# Patient Record
Sex: FEMALE | NOT HISPANIC OR LATINO | Employment: FULL TIME | URBAN - METROPOLITAN AREA
[De-identification: names, ages, dates, MRNs, and addresses within clinical notes are randomized per-mention and may not be internally consistent; named-entity substitution may affect disease eponyms.]

---

## 2023-05-02 DIAGNOSIS — M54.40 ACUTE RIGHT-SIDED LOW BACK PAIN WITH SCIATICA, SCIATICA LATERALITY UNSPECIFIED: Primary | ICD-10-CM

## 2023-05-02 RX ORDER — CYCLOBENZAPRINE HCL 10 MG
TABLET ORAL
Qty: 60 TABLET | Refills: 17 | Status: SHIPPED | OUTPATIENT
Start: 2023-05-02

## 2023-05-05 ENCOUNTER — OFFICE VISIT (OUTPATIENT)
Dept: PRIMARY CARE | Facility: CLINIC | Age: 53
End: 2023-05-05
Payer: OTHER GOVERNMENT

## 2023-05-05 ENCOUNTER — LAB (OUTPATIENT)
Dept: LAB | Facility: LAB | Age: 53
End: 2023-05-05
Payer: OTHER GOVERNMENT

## 2023-05-05 VITALS
BODY MASS INDEX: 36.37 KG/M2 | SYSTOLIC BLOOD PRESSURE: 128 MMHG | HEIGHT: 64 IN | DIASTOLIC BLOOD PRESSURE: 74 MMHG | WEIGHT: 213 LBS

## 2023-05-05 DIAGNOSIS — M77.12 LATERAL EPICONDYLITIS OF LEFT ELBOW: ICD-10-CM

## 2023-05-05 DIAGNOSIS — E78.5 HYPERLIPIDEMIA, UNSPECIFIED HYPERLIPIDEMIA TYPE: ICD-10-CM

## 2023-05-05 DIAGNOSIS — I10 PRIMARY HYPERTENSION: ICD-10-CM

## 2023-05-05 DIAGNOSIS — E13.9 OTHER SPECIFIED DIABETES MELLITUS WITHOUT COMPLICATION, WITHOUT LONG-TERM CURRENT USE OF INSULIN (MULTI): ICD-10-CM

## 2023-05-05 DIAGNOSIS — M25.561 PAIN IN BOTH KNEES, UNSPECIFIED CHRONICITY: ICD-10-CM

## 2023-05-05 DIAGNOSIS — M25.562 PAIN IN BOTH KNEES, UNSPECIFIED CHRONICITY: ICD-10-CM

## 2023-05-05 PROBLEM — N39.0 ACUTE URINARY TRACT INFECTION: Status: ACTIVE | Noted: 2023-05-05

## 2023-05-05 PROBLEM — R93.1 ABNORMAL HEART SCORE CT: Status: ACTIVE | Noted: 2023-05-05

## 2023-05-05 PROBLEM — E61.1 LOW IRON: Status: ACTIVE | Noted: 2023-05-05

## 2023-05-05 PROBLEM — K22.70 BARRETT ESOPHAGUS: Status: ACTIVE | Noted: 2023-05-05

## 2023-05-05 PROBLEM — M67.02 ACQUIRED SHORT ACHILLES TENDON OF LEFT LOWER EXTREMITY: Status: ACTIVE | Noted: 2023-05-05

## 2023-05-05 PROBLEM — E11.9 DIABETES (MULTI): Status: ACTIVE | Noted: 2023-05-05

## 2023-05-05 PROBLEM — D21.6: Status: ACTIVE | Noted: 2023-05-05

## 2023-05-05 PROBLEM — N20.9 UROLITHIASIS: Status: ACTIVE | Noted: 2023-05-05

## 2023-05-05 PROBLEM — R00.0 TACHYCARDIA: Status: ACTIVE | Noted: 2023-05-05

## 2023-05-05 PROBLEM — M79.671 INTRACTABLE RIGHT HEEL PAIN: Status: ACTIVE | Noted: 2023-05-05

## 2023-05-05 PROBLEM — M54.9 BACK PAIN: Status: ACTIVE | Noted: 2023-05-05

## 2023-05-05 PROBLEM — R60.9 EDEMA: Status: ACTIVE | Noted: 2023-05-05

## 2023-05-05 PROBLEM — D50.9 IRON DEFICIENCY ANEMIA: Status: ACTIVE | Noted: 2023-05-05

## 2023-05-05 PROBLEM — R06.09 DYSPNEA ON EXERTION: Status: ACTIVE | Noted: 2023-05-05

## 2023-05-05 PROBLEM — R91.1 PULMONARY NODULE: Status: ACTIVE | Noted: 2023-05-05

## 2023-05-05 PROBLEM — M21.6X2: Status: ACTIVE | Noted: 2023-05-05

## 2023-05-05 PROBLEM — H66.91 ACUTE RIGHT OTITIS MEDIA: Status: ACTIVE | Noted: 2023-05-05

## 2023-05-05 PROBLEM — J01.90 ACUTE SINUSITIS: Status: ACTIVE | Noted: 2023-05-05

## 2023-05-05 PROBLEM — R10.9 ABDOMINAL PAIN: Status: ACTIVE | Noted: 2023-05-05

## 2023-05-05 PROBLEM — R53.83 FATIGUE: Status: ACTIVE | Noted: 2023-05-05

## 2023-05-05 PROBLEM — R31.9 HEMATURIA: Status: ACTIVE | Noted: 2023-05-05

## 2023-05-05 PROBLEM — N20.1 RIGHT URETERAL STONE: Status: ACTIVE | Noted: 2023-05-05

## 2023-05-05 PROBLEM — E04.1 THYROID NODULE: Status: ACTIVE | Noted: 2023-05-05

## 2023-05-05 PROBLEM — E55.9 VITAMIN D DEFICIENCY: Status: ACTIVE | Noted: 2023-05-05

## 2023-05-05 PROBLEM — R25.1 TREMORS OF NERVOUS SYSTEM: Status: ACTIVE | Noted: 2023-05-05

## 2023-05-05 PROBLEM — N20.0 CALCULUS OF KIDNEY: Status: ACTIVE | Noted: 2023-05-05

## 2023-05-05 PROBLEM — M54.2 NECK PAIN: Status: ACTIVE | Noted: 2023-05-05

## 2023-05-05 PROBLEM — N92.0 SPOTTING: Status: ACTIVE | Noted: 2023-05-05

## 2023-05-05 PROBLEM — Z98.84 HISTORY OF ROUX-EN-Y GASTRIC BYPASS: Status: ACTIVE | Noted: 2023-05-05

## 2023-05-05 PROBLEM — H92.09 EAR PAIN: Status: ACTIVE | Noted: 2023-05-05

## 2023-05-05 PROCEDURE — 85027 COMPLETE CBC AUTOMATED: CPT

## 2023-05-05 PROCEDURE — 81001 URINALYSIS AUTO W/SCOPE: CPT

## 2023-05-05 PROCEDURE — 99214 OFFICE O/P EST MOD 30 MIN: CPT | Performed by: INTERNAL MEDICINE

## 2023-05-05 PROCEDURE — 83036 HEMOGLOBIN GLYCOSYLATED A1C: CPT

## 2023-05-05 PROCEDURE — 36415 COLL VENOUS BLD VENIPUNCTURE: CPT

## 2023-05-05 PROCEDURE — 4010F ACE/ARB THERAPY RXD/TAKEN: CPT | Performed by: INTERNAL MEDICINE

## 2023-05-05 PROCEDURE — 3078F DIAST BP <80 MM HG: CPT | Performed by: INTERNAL MEDICINE

## 2023-05-05 PROCEDURE — 80061 LIPID PANEL: CPT

## 2023-05-05 PROCEDURE — 3044F HG A1C LEVEL LT 7.0%: CPT | Performed by: INTERNAL MEDICINE

## 2023-05-05 PROCEDURE — 3074F SYST BP LT 130 MM HG: CPT | Performed by: INTERNAL MEDICINE

## 2023-05-05 PROCEDURE — 80053 COMPREHEN METABOLIC PANEL: CPT

## 2023-05-05 RX ORDER — FUROSEMIDE 40 MG/1
1 TABLET ORAL DAILY PRN
COMMUNITY
Start: 2022-01-13

## 2023-05-05 RX ORDER — IBUPROFEN 600 MG/1
1 TABLET ORAL 3 TIMES DAILY PRN
COMMUNITY
Start: 2020-02-07

## 2023-05-05 RX ORDER — HYDROXYZINE HYDROCHLORIDE 25 MG/1
1 TABLET, FILM COATED ORAL 3 TIMES DAILY PRN
COMMUNITY
Start: 2021-08-13 | End: 2023-07-14

## 2023-05-05 RX ORDER — SITAGLIPTIN AND METFORMIN HYDROCHLORIDE 1000; 50 MG/1; MG/1
TABLET, FILM COATED, EXTENDED RELEASE ORAL
COMMUNITY
Start: 2023-05-02 | End: 2023-07-14

## 2023-05-05 RX ORDER — AMLODIPINE BESYLATE 5 MG/1
5 TABLET ORAL DAILY
COMMUNITY
Start: 2018-03-27

## 2023-05-05 RX ORDER — LOSARTAN POTASSIUM 100 MG/1
1 TABLET ORAL DAILY
COMMUNITY
Start: 2018-03-27 | End: 2023-07-18

## 2023-05-05 RX ORDER — NAPROXEN SODIUM 220 MG/1
1 TABLET, FILM COATED ORAL DAILY
COMMUNITY

## 2023-05-05 RX ORDER — POTASSIUM CHLORIDE 1500 MG/1
1 TABLET, EXTENDED RELEASE ORAL DAILY
COMMUNITY
Start: 2020-09-04

## 2023-05-05 RX ORDER — METOPROLOL SUCCINATE 50 MG/1
1 TABLET, EXTENDED RELEASE ORAL DAILY
COMMUNITY
Start: 2022-01-13 | End: 2024-04-03

## 2023-05-05 NOTE — PROGRESS NOTES
OFFICE NOTE    NAME OF THE PATIENT: Antonio Harrison    YOB: 1970    CHIEF COMPLAINT:  This young lady today came here for multiple medical issues.  She has excruciating left elbow pain, dog pulled on her, very painful.  First thing in the morning she is stiff.  Both knees hurting and arthritis.  She is still due for a diabetic checkup.  She is due for blood work.  She came here for follow-up on various conditions.    PAST MEDICAL HISTORY:  Reviewed on EMR, unchanged.    CURRENT MEDICATIONS:  Reviewed on EMR, unchanged.  List reviewed.    ALLERGIES:  Reviewed on EMR, unchanged.    SOCIAL HISTORY:  Reviewed on EMR, unchanged.  She does not smoke and does not drink alcohol.    FAMILY HISTORY:  Reviewed on EMR, unchanged.    REVIEW OF SYSTEMS:  All 12 systems reviewed and pertaining covered in history and physical.    PHYSICAL EXAMINATION  VITAL SIGNS:  As recorded and reviewed from EMR.  RESPIRATORY:  The patient had normal inspirations and expirations.  The breath sounds were equal bilaterally and clear to auscultation.  CARDIOVASCULAR:  The patient had S1 normal, split S2 without obvious rubs, clicks, or murmurs.    GASTROINTESTINAL:  There was no hepatosplenomegaly.  There were no palpable masses and no inguinal nodes.  EXTREMITIES:  Legs had no edema.  Left hand and elbow, medial epicondyle very tender.  Movements painful.  Both knees swelling and arthritis.  NEUROLOGIC:  The patient had normal cranial nerves.  The reflexes, sensory, and motor examination were grossly within normal limits.    LAB WORK:  Laboratory testing discussed.    ASSESSMENT AND PLAN:  Elbow pain.  It looks like Golfer's elbow or medial tendinitis.  Ice, splints and Relafen.  Physical therapy.  Monitor.  Both knees pain, simple arthritis.  Start with x-ray, physical therapy.  Refer to Dr. Duran.  Type 2 diabetes.  She is taking medication.  I am going to check blood work.  Might introduce her to  newer drugs.  Blood pressure  "and cholesterol.  Monitor.  I shall see her back in a week after testing.      Kindly review this note in conjunction with EMR.   Subjective   Patient ID: Antonio Harrison is a 52 y.o. female who presents for Elbow Injury (Follow up).      HPI    Review of Systems    Objective   /74   Ht 1.626 m (5' 4\")   Wt 96.6 kg (213 lb)   BMI 36.56 kg/m²       Physical Exam    Assessment/Plan   Problem List Items Addressed This Visit          Circulatory    Hypertension    Relevant Orders    CBC    Urinalysis with Reflex Microscopic       Endocrine/Metabolic    Diabetes (CMS/[facility])    Relevant Orders    Hemoglobin A1c       Other    Hyperlipidemia    Relevant Orders    Comprehensive metabolic panel    Lipid panel     Other Visit Diagnoses       Lateral epicondylitis of left elbow        Relevant Orders    Referral to Physical Therapy    Pain in both knees, unspecified chronicity        Relevant Orders    Referral to Orthopaedic Surgery    XR knee 3 views bilateral (Completed)              "

## 2023-05-06 LAB
ALANINE AMINOTRANSFERASE (SGPT) (U/L) IN SER/PLAS: 22 U/L (ref 7–45)
ALBUMIN (G/DL) IN SER/PLAS: 4.7 G/DL (ref 3.4–5)
ALKALINE PHOSPHATASE (U/L) IN SER/PLAS: 98 U/L (ref 33–110)
ANION GAP IN SER/PLAS: 12 MMOL/L (ref 10–20)
APPEARANCE, URINE: ABNORMAL
ASPARTATE AMINOTRANSFERASE (SGOT) (U/L) IN SER/PLAS: 14 U/L (ref 9–39)
BACTERIA, URINE: ABNORMAL /HPF
BILIRUBIN TOTAL (MG/DL) IN SER/PLAS: 0.9 MG/DL (ref 0–1.2)
BILIRUBIN, URINE: NEGATIVE
BLOOD, URINE: NEGATIVE
CALCIUM (MG/DL) IN SER/PLAS: 9.6 MG/DL (ref 8.6–10.6)
CARBON DIOXIDE, TOTAL (MMOL/L) IN SER/PLAS: 27 MMOL/L (ref 21–32)
CHLORIDE (MMOL/L) IN SER/PLAS: 105 MMOL/L (ref 98–107)
CHOLESTEROL (MG/DL) IN SER/PLAS: 222 MG/DL (ref 0–199)
CHOLESTEROL IN HDL (MG/DL) IN SER/PLAS: 58.9 MG/DL
CHOLESTEROL/HDL RATIO: 3.8
COLOR, URINE: YELLOW
CREATININE (MG/DL) IN SER/PLAS: 1.07 MG/DL (ref 0.5–1.05)
ERYTHROCYTE DISTRIBUTION WIDTH (RATIO) BY AUTOMATED COUNT: 13.3 % (ref 11.5–14.5)
ERYTHROCYTE MEAN CORPUSCULAR HEMOGLOBIN CONCENTRATION (G/DL) BY AUTOMATED: 33.1 G/DL (ref 32–36)
ERYTHROCYTE MEAN CORPUSCULAR VOLUME (FL) BY AUTOMATED COUNT: 86 FL (ref 80–100)
ERYTHROCYTES (10*6/UL) IN BLOOD BY AUTOMATED COUNT: 5.29 X10E12/L (ref 4–5.2)
ESTIMATED AVERAGE GLUCOSE FOR HBA1C: 151 MG/DL
GFR FEMALE: 63 ML/MIN/1.73M2
GLUCOSE (MG/DL) IN SER/PLAS: 172 MG/DL (ref 74–99)
GLUCOSE, URINE: NEGATIVE MG/DL
HEMATOCRIT (%) IN BLOOD BY AUTOMATED COUNT: 45.6 % (ref 36–46)
HEMOGLOBIN (G/DL) IN BLOOD: 15.1 G/DL (ref 12–16)
HEMOGLOBIN A1C/HEMOGLOBIN TOTAL IN BLOOD: 6.9 %
KETONES, URINE: NEGATIVE MG/DL
LDL: ABNORMAL MG/DL (ref 0–99)
LEUKOCYTE ESTERASE, URINE: NEGATIVE
LEUKOCYTES (10*3/UL) IN BLOOD BY AUTOMATED COUNT: 9.9 X10E9/L (ref 4.4–11.3)
MUCUS, URINE: ABNORMAL /LPF
NITRITE, URINE: NEGATIVE
NON HDL CHOLESTEROL: 163 MG/DL
NRBC (PER 100 WBCS) BY AUTOMATED COUNT: 0 /100 WBC (ref 0–0)
PH, URINE: 5 (ref 5–8)
PLATELETS (10*3/UL) IN BLOOD AUTOMATED COUNT: 327 X10E9/L (ref 150–450)
POTASSIUM (MMOL/L) IN SER/PLAS: 4.1 MMOL/L (ref 3.5–5.3)
PROTEIN TOTAL: 7.4 G/DL (ref 6.4–8.2)
PROTEIN, URINE: ABNORMAL MG/DL
RBC, URINE: 3 /HPF (ref 0–5)
SODIUM (MMOL/L) IN SER/PLAS: 140 MMOL/L (ref 136–145)
SPECIFIC GRAVITY, URINE: 1.02 (ref 1–1.03)
SQUAMOUS EPITHELIAL CELLS, URINE: 9 /HPF
TRANSITIONAL EPITHELIAL CELLS, URINE: <1 /HPF
TRIGLYCERIDE (MG/DL) IN SER/PLAS: 404 MG/DL (ref 0–149)
UREA NITROGEN (MG/DL) IN SER/PLAS: 20 MG/DL (ref 6–23)
UROBILINOGEN, URINE: 2 MG/DL (ref 0–1.9)
VLDL: ABNORMAL MG/DL (ref 0–40)
WBC, URINE: 3 /HPF (ref 0–5)

## 2023-05-12 ENCOUNTER — APPOINTMENT (OUTPATIENT)
Dept: PRIMARY CARE | Facility: CLINIC | Age: 53
End: 2023-05-12
Payer: OTHER GOVERNMENT

## 2023-05-15 ENCOUNTER — APPOINTMENT (OUTPATIENT)
Dept: PRIMARY CARE | Facility: CLINIC | Age: 53
End: 2023-05-15
Payer: OTHER GOVERNMENT

## 2023-05-19 ENCOUNTER — APPOINTMENT (OUTPATIENT)
Dept: PRIMARY CARE | Facility: CLINIC | Age: 53
End: 2023-05-19
Payer: OTHER GOVERNMENT

## 2023-06-05 ENCOUNTER — OFFICE VISIT (OUTPATIENT)
Dept: PRIMARY CARE | Facility: CLINIC | Age: 53
End: 2023-06-05
Payer: OTHER GOVERNMENT

## 2023-06-05 VITALS
WEIGHT: 214 LBS | HEIGHT: 64 IN | SYSTOLIC BLOOD PRESSURE: 134 MMHG | BODY MASS INDEX: 36.54 KG/M2 | DIASTOLIC BLOOD PRESSURE: 76 MMHG

## 2023-06-05 DIAGNOSIS — E78.5 HYPERLIPIDEMIA, UNSPECIFIED HYPERLIPIDEMIA TYPE: ICD-10-CM

## 2023-06-05 DIAGNOSIS — E04.1 THYROID NODULE: ICD-10-CM

## 2023-06-05 DIAGNOSIS — I10 PRIMARY HYPERTENSION: ICD-10-CM

## 2023-06-05 DIAGNOSIS — E13.9 OTHER SPECIFIED DIABETES MELLITUS WITHOUT COMPLICATION, WITHOUT LONG-TERM CURRENT USE OF INSULIN (MULTI): ICD-10-CM

## 2023-06-05 PROCEDURE — 99213 OFFICE O/P EST LOW 20 MIN: CPT | Performed by: INTERNAL MEDICINE

## 2023-06-05 PROCEDURE — 3075F SYST BP GE 130 - 139MM HG: CPT | Performed by: INTERNAL MEDICINE

## 2023-06-05 PROCEDURE — 3078F DIAST BP <80 MM HG: CPT | Performed by: INTERNAL MEDICINE

## 2023-06-05 PROCEDURE — 3044F HG A1C LEVEL LT 7.0%: CPT | Performed by: INTERNAL MEDICINE

## 2023-06-05 PROCEDURE — 4010F ACE/ARB THERAPY RXD/TAKEN: CPT | Performed by: INTERNAL MEDICINE

## 2023-06-05 NOTE — PROGRESS NOTES
"OFFICE NOTE    NAME OF THE PATIENT: Antonio Harrison    YOB: 1970    CHIEF COMPLAINT:  It is always a delight to serve Ms. Harrison, who is working from home in these days.  She has a standup desk.  Things okay.  Blood sugar okay.  She came here for follow-up on various conditions.    PAST MEDICAL HISTORY:  Reviewed on EMR, unchanged.    CURRENT MEDICATIONS:  Reviewed on EMR, unchanged.  List reviewed.    ALLERGIES:  Reviewed on EMR, unchanged.    SOCIAL HISTORY:  Reviewed on EMR, unchanged.  She does not smoke and does not drink alcohol.    FAMILY HISTORY:  Reviewed on EMR, unchanged.    REVIEW OF SYSTEMS:  All 12 systems reviewed and pertaining covered in history and physical.    PHYSICAL EXAMINATION  VITAL SIGNS:  As recorded and reviewed from EMR.  RESPIRATORY:  The patient had normal inspirations and expirations.  The breath sounds were equal bilaterally and clear to auscultation.  CARDIOVASCULAR:  The patient had S1 normal, split S2 without obvious rubs, clicks, or murmurs.    GASTROINTESTINAL:  There was no hepatosplenomegaly.  There were no palpable masses and no inguinal nodes.  EXTREMITIES:  Legs had no edema.  NEUROLOGIC:  The patient had normal cranial nerves.  The reflexes, sensory, and motor examination were grossly within normal limits.    LAB WORK:  Laboratory testing discussed.    ASSESSMENT AND PLAN:  Type 2 diabetes.  I will continue with current medication.  We discussed about adding Farxiga, but she has a yeast problem.  Hypertension, okay.  High cholesterol, okay.  Thyroid, okay.  Follow-up appointment in August.  Happy to see her anytime sooner if necessary.      Kindly review this note in conjunction with EMR.     Subjective   Patient ID: Antonio Harrison is a 52 y.o. female who presents for Follow-up (results).      HPI    Review of Systems    Objective   /76   Ht 1.626 m (5' 4\")   Wt 97.1 kg (214 lb)   BMI 36.73 kg/m²       Physical Exam    Assessment/Plan   Problem List " Items Addressed This Visit    None

## 2023-07-14 DIAGNOSIS — E13.9 OTHER SPECIFIED DIABETES MELLITUS WITHOUT COMPLICATION, WITHOUT LONG-TERM CURRENT USE OF INSULIN (MULTI): Primary | ICD-10-CM

## 2023-07-14 RX ORDER — HYDROXYZINE HYDROCHLORIDE 25 MG/1
TABLET, FILM COATED ORAL
Qty: 180 TABLET | Refills: 3 | Status: SHIPPED | OUTPATIENT
Start: 2023-07-14

## 2023-07-14 RX ORDER — SITAGLIPTIN AND METFORMIN HYDROCHLORIDE 1000; 50 MG/1; MG/1
TABLET, FILM COATED, EXTENDED RELEASE ORAL
Qty: 180 TABLET | Refills: 3 | Status: SHIPPED | OUTPATIENT
Start: 2023-07-14

## 2023-08-01 DIAGNOSIS — I10 PRIMARY HYPERTENSION: ICD-10-CM

## 2023-08-01 RX ORDER — LOSARTAN POTASSIUM 100 MG/1
100 TABLET ORAL DAILY
Qty: 90 TABLET | Refills: 3 | Status: SHIPPED | OUTPATIENT
Start: 2023-08-01

## 2023-08-17 ENCOUNTER — OFFICE VISIT (OUTPATIENT)
Dept: PRIMARY CARE | Facility: CLINIC | Age: 53
End: 2023-08-17
Payer: OTHER GOVERNMENT

## 2023-08-17 VITALS
BODY MASS INDEX: 35.44 KG/M2 | HEIGHT: 64 IN | DIASTOLIC BLOOD PRESSURE: 72 MMHG | SYSTOLIC BLOOD PRESSURE: 130 MMHG | WEIGHT: 207.6 LBS

## 2023-08-17 DIAGNOSIS — E11.9 TYPE 2 DIABETES MELLITUS WITHOUT COMPLICATION, WITHOUT LONG-TERM CURRENT USE OF INSULIN (MULTI): Primary | ICD-10-CM

## 2023-08-17 PROBLEM — N63.20 LUMP OF LEFT BREAST: Status: ACTIVE | Noted: 2023-08-17

## 2023-08-17 PROBLEM — M72.2 PLANTAR FASCIITIS OF LEFT FOOT: Status: ACTIVE | Noted: 2023-08-17

## 2023-08-17 PROBLEM — N23 URETERAL COLIC: Status: ACTIVE | Noted: 2023-08-17

## 2023-08-17 PROBLEM — N12 PYELONEPHRITIS: Status: ACTIVE | Noted: 2023-08-17

## 2023-08-17 PROCEDURE — 3078F DIAST BP <80 MM HG: CPT | Performed by: INTERNAL MEDICINE

## 2023-08-17 PROCEDURE — 4010F ACE/ARB THERAPY RXD/TAKEN: CPT | Performed by: INTERNAL MEDICINE

## 2023-08-17 PROCEDURE — 3044F HG A1C LEVEL LT 7.0%: CPT | Performed by: INTERNAL MEDICINE

## 2023-08-17 PROCEDURE — 3075F SYST BP GE 130 - 139MM HG: CPT | Performed by: INTERNAL MEDICINE

## 2023-08-17 PROCEDURE — 1036F TOBACCO NON-USER: CPT | Performed by: INTERNAL MEDICINE

## 2023-08-17 PROCEDURE — 99214 OFFICE O/P EST MOD 30 MIN: CPT | Performed by: INTERNAL MEDICINE

## 2023-08-17 NOTE — PROGRESS NOTES
"OFFICE NOTE    NAME OF THE PATIENT: Antonio Harrison    YOB: 1970    CHIEF COMPLAINT:  This young lady today came here for multiple medical issues.  Overall, she is a happy person.  She is due for blood work.  She is having ADD symptoms.  She was treated in the past.  Now, she is finding very difficult to concentrate, did everything happened including COVID.  She thinks that she can benefit from treatment for ADD.  She came here for follow-up on various conditions.    PAST MEDICAL HISTORY:  Reviewed on EMR, unchanged.    CURRENT MEDICATIONS:  Reviewed on EMR, unchanged.    ALLERGIES:  Reviewed on EMR, unchanged.    SOCIAL HISTORY:  Reviewed on EMR, unchanged.    FAMILY HISTORY:  Reviewed on EMR, unchanged.    REVIEW OF SYSTEMS:  All 12 systems reviewed and pertaining covered in history and physical.    PHYSICAL EXAMINATION  VITAL SIGNS:  As recorded and reviewed from EMR.  RESPIRATORY:  The patient had normal inspirations and expirations.  The breath sounds were equal bilaterally and clear to auscultation.  CARDIOVASCULAR:  The patient had S1 normal, split S2 without obvious rubs, clicks, or murmurs.    GASTROINTESTINAL:  There was no hepatosplenomegaly.  There were no palpable masses and no inguinal nodes.  EXTREMITIES:  Legs had no edema.  NEUROLOGIC:  The patient had normal cranial nerves.  The reflexes, sensory, and motor examination were grossly within normal limits.    LAB WORK:  Laboratory testing discussed.    ASSESSMENT AND PLAN:  ADD.  Referred to [facility] for counseling and treatment.  Type 2 diabetes.  We will check hemoglobin A1c.  Hypertension, okay.  High cholesterol, stable.  I urged her to see me in a week after blood test.      Kindly review this note in conjunction with EMR.   Subjective   Patient ID: Antonio Harrison is a 52 y.o. female who presents for Med Refill.      HPI    Review of Systems    Objective   /72   Ht 1.626 m (5' 4\")   Wt 94.2 kg (207 lb 9.6 oz)   BMI 35.63 kg/m² "       Physical Exam    Assessment/Plan   Problem List Items Addressed This Visit    None

## 2023-10-19 ENCOUNTER — APPOINTMENT (OUTPATIENT)
Dept: RADIOLOGY | Facility: HOSPITAL | Age: 53
End: 2023-10-19
Payer: OTHER GOVERNMENT

## 2023-10-19 DIAGNOSIS — Z12.31 SCREENING MAMMOGRAM FOR BREAST CANCER: ICD-10-CM

## 2023-10-26 ENCOUNTER — APPOINTMENT (OUTPATIENT)
Dept: RADIOLOGY | Facility: HOSPITAL | Age: 53
End: 2023-10-26
Payer: OTHER GOVERNMENT

## 2023-10-31 ENCOUNTER — TELEMEDICINE (OUTPATIENT)
Dept: PRIMARY CARE | Facility: CLINIC | Age: 53
End: 2023-10-31
Payer: OTHER GOVERNMENT

## 2023-10-31 DIAGNOSIS — U07.1 COVID-19: Primary | ICD-10-CM

## 2023-10-31 PROCEDURE — 99213 OFFICE O/P EST LOW 20 MIN: CPT | Performed by: INTERNAL MEDICINE

## 2023-10-31 RX ORDER — GUAIFENESIN 100 MG/5ML
200 SOLUTION ORAL 3 TIMES DAILY PRN
Qty: 120 ML | Refills: 0 | Status: SHIPPED | OUTPATIENT
Start: 2023-10-31 | End: 2023-11-10

## 2023-10-31 RX ORDER — LORATADINE 10 MG/1
10 TABLET ORAL DAILY
Qty: 30 TABLET | Refills: 2 | Status: SHIPPED | OUTPATIENT
Start: 2023-10-31 | End: 2023-10-31

## 2023-10-31 NOTE — PROGRESS NOTES
Subjective   Patient ID: Antonio Harrison is a 53 y.o. female who presents for Follow-up (Multiple medical issues).    Antonio Harrison today came here for a virtual visit.  She has cough, yellow sputum, sinus congestion, shortness of breath, wheezing.  She did COVID test, positive today.  Her  has same  feeling of congestion and cough.  Not feeling good.    I have personally reviewed the patient's Past Medical History, Medications, Allergies, Social History, and Family History in the EMR.    Review of Systems   All other systems reviewed and are negative.    Objective   Virtual exam.  There were no vitals taken for this visit.    Physical Exam  HENT:      Nose: Congestion present.      Comments: Postnasal drip.     Mouth/Throat:      Comments: Throat congested.  Pulmonary:      Breath sounds: Wheezing present.     LAB WORK: Laboratory testing discussed.    Assessment/Plan   Problem List Items Addressed This Visit    None  Visit Diagnoses         Codes    COVID-19    -  Primary U07.1    Relevant Medications    nirmatrelvir-ritonavir (PAXLOVID) 300 mg (150 mg x 2)-100 mg tablet therapy pack    guaiFENesin (Robitussin) 100 mg/5 mL syrup        1. COVID 19 infection.  Symptomatic in diabetic.  Paxlovid, Claritin, Robitussin, Flonase.  Supportive care.  Work off given.  Keep hydrated.  Continue to follow.  2. Virtual visit 26 minutes, more than half in direct patient care.    Scribe Attestation  By signing my name below, Amie CHAVEZ Scribe attest that this documentation has been prepared under the direction and in the presence of Samuel Martinez MD.

## 2024-01-02 ENCOUNTER — OFFICE VISIT (OUTPATIENT)
Dept: PRIMARY CARE | Facility: CLINIC | Age: 54
End: 2024-01-02
Payer: OTHER GOVERNMENT

## 2024-01-02 VITALS
HEIGHT: 64 IN | WEIGHT: 200 LBS | BODY MASS INDEX: 34.15 KG/M2 | SYSTOLIC BLOOD PRESSURE: 126 MMHG | DIASTOLIC BLOOD PRESSURE: 74 MMHG

## 2024-01-02 DIAGNOSIS — U07.1 COVID-19: ICD-10-CM

## 2024-01-02 DIAGNOSIS — J01.90 ACUTE SINUSITIS, RECURRENCE NOT SPECIFIED, UNSPECIFIED LOCATION: Primary | ICD-10-CM

## 2024-01-02 DIAGNOSIS — H66.90 EAR INFECTION: ICD-10-CM

## 2024-01-02 DIAGNOSIS — I10 PRIMARY HYPERTENSION: ICD-10-CM

## 2024-01-02 DIAGNOSIS — E11.9 TYPE 2 DIABETES MELLITUS WITHOUT COMPLICATION, WITHOUT LONG-TERM CURRENT USE OF INSULIN (MULTI): ICD-10-CM

## 2024-01-02 PROCEDURE — 3078F DIAST BP <80 MM HG: CPT | Performed by: INTERNAL MEDICINE

## 2024-01-02 PROCEDURE — 99214 OFFICE O/P EST MOD 30 MIN: CPT | Performed by: INTERNAL MEDICINE

## 2024-01-02 PROCEDURE — 3074F SYST BP LT 130 MM HG: CPT | Performed by: INTERNAL MEDICINE

## 2024-01-02 PROCEDURE — 1036F TOBACCO NON-USER: CPT | Performed by: INTERNAL MEDICINE

## 2024-01-02 PROCEDURE — 4010F ACE/ARB THERAPY RXD/TAKEN: CPT | Performed by: INTERNAL MEDICINE

## 2024-01-02 RX ORDER — MINERAL OIL
180 ENEMA (ML) RECTAL DAILY
COMMUNITY

## 2024-01-02 RX ORDER — DEXTROAMPHETAMINE SACCHARATE, AMPHETAMINE ASPARTATE, DEXTROAMPHETAMINE SULFATE AND AMPHETAMINE SULFATE 5; 5; 5; 5 MG/1; MG/1; MG/1; MG/1
TABLET ORAL
COMMUNITY
Start: 2023-12-21

## 2024-01-02 RX ORDER — AMOXICILLIN AND CLAVULANATE POTASSIUM 875; 125 MG/1; MG/1
875 TABLET, FILM COATED ORAL 2 TIMES DAILY
Qty: 20 TABLET | Refills: 0 | Status: SHIPPED | OUTPATIENT
Start: 2024-01-02 | End: 2024-01-12

## 2024-01-02 RX ORDER — FLUTICASONE FUROATE 27.5 UG/1
1 SPRAY, METERED NASAL
Qty: 10 G | Refills: 5 | Status: SHIPPED | OUTPATIENT
Start: 2024-01-02 | End: 2025-01-01

## 2024-01-02 RX ORDER — LORATADINE 10 MG/1
10 TABLET ORAL DAILY
Qty: 90 TABLET | Refills: 0 | Status: SHIPPED | OUTPATIENT
Start: 2024-01-02

## 2024-01-03 NOTE — PROGRESS NOTES
"Subjective   Patient ID: Antonio Harrison is a 53 y.o. female who presents for Follow-up (Various conditions).    Ms. Alvarez today came here for sinus congestion, postnasal congestion, both ears are blocked, congested, not feeling good.  Feeling congested.  She came here for follow-up on various conditions.  Appetite and weight are okay.  No problem.    I have personally reviewed the patient's Past Medical History, Medications, Allergies, Social History, and Family History in the EMR.    Review of Systems   All other systems reviewed and are negative.    Objective   /74   Ht 1.626 m (5' 4\")   Wt 90.7 kg (200 lb)   BMI 34.33 kg/m²     Physical Exam  Vitals reviewed.   HENT:      Right Ear: Tympanic membrane is retracted.      Left Ear: Tympanic membrane is retracted.      Nose: Congestion present.      Comments: Postnasal drip.     Mouth/Throat:      Comments: Throat congested.  Cardiovascular:      Heart sounds: Normal heart sounds, S1 normal and S2 normal. No murmur heard.     No friction rub.   Pulmonary:      Effort: Pulmonary effort is normal.      Breath sounds: Normal breath sounds and air entry.   Abdominal:      Palpations: There is no hepatomegaly, splenomegaly or mass.   Musculoskeletal:      Right lower leg: No edema.      Left lower leg: No edema.   Lymphadenopathy:      Lower Body: No right inguinal adenopathy. No left inguinal adenopathy.   Neurological:      Cranial Nerves: Cranial nerves 2-12 are intact.      Sensory: No sensory deficit.      Motor: Motor function is intact.      Deep Tendon Reflexes: Reflexes are normal and symmetric.     LAB WORK:  Laboratory testing discussed.    Assessment/Plan   Problem List Items Addressed This Visit             ICD-10-CM       Cardiac and Vasculature    Hypertension I10       ENT    Acute sinusitis - Primary J01.90       Endocrine/Metabolic    Diabetes (CMS/HCC) E11.9     Other Visit Diagnoses         Codes    COVID-19     U07.1    Ear infection     H66.90 "    Relevant Medications    loratadine (Claritin) 10 mg tablet    fluticasone (Flonase Sensimist) 27.5 mcg/actuation nasal spray    amoxicillin-pot clavulanate (Augmentin) 875-125 mg tablet        1.  Acute sinusitis, postnasal drip, otitis media.  Saline gargles and steam inhalation.  Augmentin, Claritin-D, Robitussin, Nasacort nasal spray, and Valsalva manuever.  2. Type 2 diabetes.  The patient is known diabetic.  She is going to do blood work and see me.  3. Hypertension, okay.  4. I urged her to see me in a week after the blood test.    Scribe Attestation  By signing my name below, I, Tan Pollard attest that this documentation has been prepared under the direction and in the presence of Samuel Martinez MD.

## 2024-06-04 ENCOUNTER — LAB (OUTPATIENT)
Dept: LAB | Facility: LAB | Age: 54
End: 2024-06-04
Payer: OTHER GOVERNMENT

## 2024-06-04 DIAGNOSIS — E13.9 OTHER SPECIFIED DIABETES MELLITUS WITHOUT COMPLICATION, WITHOUT LONG-TERM CURRENT USE OF INSULIN (MULTI): ICD-10-CM

## 2024-06-04 DIAGNOSIS — E78.5 HYPERLIPIDEMIA, UNSPECIFIED HYPERLIPIDEMIA TYPE: ICD-10-CM

## 2024-06-04 DIAGNOSIS — E04.1 THYROID NODULE: ICD-10-CM

## 2024-06-04 DIAGNOSIS — I10 PRIMARY HYPERTENSION: ICD-10-CM

## 2024-06-04 LAB
ALBUMIN SERPL BCP-MCNC: 4.4 G/DL (ref 3.4–5)
ALP SERPL-CCNC: 94 U/L (ref 33–110)
ALT SERPL W P-5'-P-CCNC: 35 U/L (ref 7–45)
ANION GAP SERPL CALC-SCNC: 14 MMOL/L (ref 10–20)
AST SERPL W P-5'-P-CCNC: 25 U/L (ref 9–39)
BILIRUB SERPL-MCNC: 1.7 MG/DL (ref 0–1.2)
BUN SERPL-MCNC: 20 MG/DL (ref 6–23)
CALCIUM SERPL-MCNC: 9.4 MG/DL (ref 8.6–10.6)
CHLORIDE SERPL-SCNC: 102 MMOL/L (ref 98–107)
CHOLEST SERPL-MCNC: 195 MG/DL (ref 0–199)
CHOLESTEROL/HDL RATIO: 3.5
CO2 SERPL-SCNC: 24 MMOL/L (ref 21–32)
CREAT SERPL-MCNC: 0.99 MG/DL (ref 0.5–1.05)
EGFRCR SERPLBLD CKD-EPI 2021: 69 ML/MIN/1.73M*2
ERYTHROCYTE [DISTWIDTH] IN BLOOD BY AUTOMATED COUNT: 13.4 % (ref 11.5–14.5)
EST. AVERAGE GLUCOSE BLD GHB EST-MCNC: 148 MG/DL
GLUCOSE SERPL-MCNC: 203 MG/DL (ref 74–99)
HBA1C MFR BLD: 6.8 %
HCT VFR BLD AUTO: 42.3 % (ref 36–46)
HDLC SERPL-MCNC: 56.3 MG/DL
HGB BLD-MCNC: 14.4 G/DL (ref 12–16)
LDLC SERPL CALC-MCNC: 96 MG/DL
MCH RBC QN AUTO: 28.5 PG (ref 26–34)
MCHC RBC AUTO-ENTMCNC: 34 G/DL (ref 32–36)
MCV RBC AUTO: 84 FL (ref 80–100)
NON HDL CHOLESTEROL: 139 MG/DL (ref 0–149)
NRBC BLD-RTO: 0 /100 WBCS (ref 0–0)
PLATELET # BLD AUTO: 290 X10*3/UL (ref 150–450)
POTASSIUM SERPL-SCNC: 4.3 MMOL/L (ref 3.5–5.3)
PROT SERPL-MCNC: 7.2 G/DL (ref 6.4–8.2)
RBC # BLD AUTO: 5.05 X10*6/UL (ref 4–5.2)
SODIUM SERPL-SCNC: 136 MMOL/L (ref 136–145)
TRIGL SERPL-MCNC: 216 MG/DL (ref 0–149)
TSH SERPL-ACNC: 1.86 MIU/L (ref 0.44–3.98)
VLDL: 43 MG/DL (ref 0–40)
WBC # BLD AUTO: 7.9 X10*3/UL (ref 4.4–11.3)

## 2024-06-04 PROCEDURE — 83036 HEMOGLOBIN GLYCOSYLATED A1C: CPT

## 2024-06-04 PROCEDURE — 84443 ASSAY THYROID STIM HORMONE: CPT

## 2024-06-04 PROCEDURE — 85027 COMPLETE CBC AUTOMATED: CPT

## 2024-06-04 PROCEDURE — 80061 LIPID PANEL: CPT

## 2024-06-04 PROCEDURE — 80053 COMPREHEN METABOLIC PANEL: CPT

## 2024-06-04 PROCEDURE — 36415 COLL VENOUS BLD VENIPUNCTURE: CPT

## 2024-06-11 ENCOUNTER — APPOINTMENT (OUTPATIENT)
Dept: PRIMARY CARE | Facility: CLINIC | Age: 54
End: 2024-06-11
Payer: OTHER GOVERNMENT

## 2024-06-11 DIAGNOSIS — Z12.31 VISIT FOR SCREENING MAMMOGRAM: Primary | ICD-10-CM

## 2024-06-18 ENCOUNTER — APPOINTMENT (OUTPATIENT)
Dept: PRIMARY CARE | Facility: CLINIC | Age: 54
End: 2024-06-18
Payer: OTHER GOVERNMENT

## 2024-06-24 ENCOUNTER — APPOINTMENT (OUTPATIENT)
Dept: PRIMARY CARE | Facility: CLINIC | Age: 54
End: 2024-06-24
Payer: OTHER GOVERNMENT

## 2024-06-24 VITALS
SYSTOLIC BLOOD PRESSURE: 128 MMHG | WEIGHT: 197 LBS | BODY MASS INDEX: 33.63 KG/M2 | HEIGHT: 64 IN | DIASTOLIC BLOOD PRESSURE: 70 MMHG

## 2024-06-24 DIAGNOSIS — I10 PRIMARY HYPERTENSION: ICD-10-CM

## 2024-06-24 DIAGNOSIS — E11.9 TYPE 2 DIABETES MELLITUS WITHOUT COMPLICATION, WITHOUT LONG-TERM CURRENT USE OF INSULIN (MULTI): ICD-10-CM

## 2024-06-24 DIAGNOSIS — M20.012 ACQUIRED MALLET THUMB OF LEFT HAND: ICD-10-CM

## 2024-06-24 DIAGNOSIS — E13.9 OTHER SPECIFIED DIABETES MELLITUS WITHOUT COMPLICATION, WITHOUT LONG-TERM CURRENT USE OF INSULIN (MULTI): ICD-10-CM

## 2024-06-24 DIAGNOSIS — Z13.29 THYROID DISORDER SCREENING: ICD-10-CM

## 2024-06-24 DIAGNOSIS — E78.00 HIGH CHOLESTEROL: Primary | ICD-10-CM

## 2024-06-24 DIAGNOSIS — M79.646 THUMB PAIN, UNSPECIFIED LATERALITY: ICD-10-CM

## 2024-06-24 PROBLEM — N92.0 MENSTRUAL SPOTTING: Status: ACTIVE | Noted: 2024-06-24

## 2024-06-24 PROBLEM — M67.00 ACQUIRED SHORT ACHILLES TENDON: Status: ACTIVE | Noted: 2024-06-24

## 2024-06-24 PROBLEM — D21.6: Status: ACTIVE | Noted: 2024-06-24

## 2024-06-24 PROBLEM — R91.1 LUNG NODULE: Status: ACTIVE | Noted: 2024-06-24

## 2024-06-24 PROBLEM — R25.1 TREMOR: Status: ACTIVE | Noted: 2024-06-24

## 2024-06-24 PROBLEM — M79.673 HEEL PAIN: Status: ACTIVE | Noted: 2024-06-24

## 2024-06-24 PROBLEM — K22.70 BARRETT'S ESOPHAGUS: Status: ACTIVE | Noted: 2024-06-24

## 2024-06-24 PROBLEM — N63.0 MASS OF BREAST: Status: ACTIVE | Noted: 2024-06-24

## 2024-06-24 PROBLEM — R10.2 PAIN IN PELVIS: Status: ACTIVE | Noted: 2024-06-24

## 2024-06-24 PROBLEM — H92.09 PAIN OF EAR STRUCTURE: Status: ACTIVE | Noted: 2024-06-24

## 2024-06-24 PROBLEM — M72.2 PLANTAR FASCIITIS: Status: ACTIVE | Noted: 2024-06-24

## 2024-06-24 PROCEDURE — 99214 OFFICE O/P EST MOD 30 MIN: CPT | Performed by: INTERNAL MEDICINE

## 2024-06-24 PROCEDURE — 3048F LDL-C <100 MG/DL: CPT | Performed by: INTERNAL MEDICINE

## 2024-06-24 PROCEDURE — 3044F HG A1C LEVEL LT 7.0%: CPT | Performed by: INTERNAL MEDICINE

## 2024-06-24 PROCEDURE — 4010F ACE/ARB THERAPY RXD/TAKEN: CPT | Performed by: INTERNAL MEDICINE

## 2024-06-24 PROCEDURE — 3078F DIAST BP <80 MM HG: CPT | Performed by: INTERNAL MEDICINE

## 2024-06-24 PROCEDURE — 3074F SYST BP LT 130 MM HG: CPT | Performed by: INTERNAL MEDICINE

## 2024-06-24 RX ORDER — METFORMIN HYDROCHLORIDE 1000 MG/1
1000 TABLET ORAL
Qty: 180 TABLET | Refills: 1 | Status: SHIPPED | OUTPATIENT
Start: 2024-06-24

## 2024-06-24 RX ORDER — LOSARTAN POTASSIUM 100 MG/1
100 TABLET ORAL DAILY
Qty: 90 TABLET | Refills: 1 | Status: SHIPPED | OUTPATIENT
Start: 2024-06-24

## 2024-06-24 RX ORDER — DULAGLUTIDE 0.75 MG/.5ML
0.75 INJECTION, SOLUTION SUBCUTANEOUS
Qty: 2 ML | Refills: 0 | Status: SHIPPED | OUTPATIENT
Start: 2024-06-30

## 2024-06-24 RX ORDER — SITAGLIPTIN AND METFORMIN HYDROCHLORIDE 1000; 50 MG/1; MG/1
TABLET, FILM COATED, EXTENDED RELEASE ORAL
Qty: 180 TABLET | Refills: 1 | Status: SHIPPED | OUTPATIENT
Start: 2024-06-24

## 2024-06-24 RX ORDER — HYDROXYZINE HYDROCHLORIDE 25 MG/1
TABLET, FILM COATED ORAL
Qty: 180 TABLET | Refills: 1 | Status: SHIPPED | OUTPATIENT
Start: 2024-06-24

## 2024-06-24 RX ORDER — METOPROLOL SUCCINATE 50 MG/1
50 TABLET, EXTENDED RELEASE ORAL DAILY
Qty: 90 TABLET | Refills: 1 | Status: SHIPPED | OUTPATIENT
Start: 2024-06-24

## 2024-06-24 ASSESSMENT — ENCOUNTER SYMPTOMS
OCCASIONAL FEELINGS OF UNSTEADINESS: 0
DEPRESSION: 0
LOSS OF SENSATION IN FEET: 0

## 2024-06-27 DIAGNOSIS — E11.9 TYPE 2 DIABETES MELLITUS WITHOUT COMPLICATION, WITHOUT LONG-TERM CURRENT USE OF INSULIN (MULTI): ICD-10-CM

## 2024-06-27 RX ORDER — DULAGLUTIDE 0.75 MG/.5ML
0.75 INJECTION, SOLUTION SUBCUTANEOUS
Qty: 2 ML | Refills: 1 | Status: CANCELLED | OUTPATIENT
Start: 2024-06-30 | End: 2024-07-30

## 2024-07-17 DIAGNOSIS — E11.9 TYPE 2 DIABETES MELLITUS WITHOUT COMPLICATION, WITHOUT LONG-TERM CURRENT USE OF INSULIN (MULTI): ICD-10-CM

## 2024-07-17 RX ORDER — DULAGLUTIDE 0.75 MG/.5ML
0.75 INJECTION, SOLUTION SUBCUTANEOUS
Qty: 2 ML | Refills: 0 | Status: SHIPPED | OUTPATIENT
Start: 2024-07-21 | End: 2024-07-26 | Stop reason: DRUGHIGH

## 2024-07-19 RX ORDER — DULAGLUTIDE 1.5 MG/.5ML
1.5 INJECTION, SOLUTION SUBCUTANEOUS
Qty: 2 ML | Refills: 0 | Status: SHIPPED | OUTPATIENT
Start: 2024-07-21

## 2024-08-05 ENCOUNTER — APPOINTMENT (OUTPATIENT)
Dept: PRIMARY CARE | Facility: CLINIC | Age: 54
End: 2024-08-05
Payer: OTHER GOVERNMENT

## 2024-08-05 VITALS
BODY MASS INDEX: 33.8 KG/M2 | SYSTOLIC BLOOD PRESSURE: 124 MMHG | WEIGHT: 198 LBS | DIASTOLIC BLOOD PRESSURE: 70 MMHG | HEIGHT: 64 IN

## 2024-08-05 DIAGNOSIS — E78.00 HIGH CHOLESTEROL: ICD-10-CM

## 2024-08-05 DIAGNOSIS — R11.0 NAUSEA: ICD-10-CM

## 2024-08-05 DIAGNOSIS — E13.9 OTHER SPECIFIED DIABETES MELLITUS WITHOUT COMPLICATION, WITHOUT LONG-TERM CURRENT USE OF INSULIN (MULTI): ICD-10-CM

## 2024-08-05 DIAGNOSIS — E55.9 VITAMIN D DEFICIENCY: ICD-10-CM

## 2024-08-05 DIAGNOSIS — K21.9 GASTROESOPHAGEAL REFLUX DISEASE, UNSPECIFIED WHETHER ESOPHAGITIS PRESENT: Primary | ICD-10-CM

## 2024-08-05 DIAGNOSIS — E11.9 TYPE 2 DIABETES MELLITUS WITHOUT COMPLICATION, WITHOUT LONG-TERM CURRENT USE OF INSULIN (MULTI): ICD-10-CM

## 2024-08-05 DIAGNOSIS — E04.1 THYROID NODULE: ICD-10-CM

## 2024-08-05 DIAGNOSIS — I10 PRIMARY HYPERTENSION: ICD-10-CM

## 2024-08-05 PROCEDURE — 3078F DIAST BP <80 MM HG: CPT | Performed by: INTERNAL MEDICINE

## 2024-08-05 PROCEDURE — 99214 OFFICE O/P EST MOD 30 MIN: CPT | Performed by: INTERNAL MEDICINE

## 2024-08-05 PROCEDURE — 3044F HG A1C LEVEL LT 7.0%: CPT | Performed by: INTERNAL MEDICINE

## 2024-08-05 PROCEDURE — 3008F BODY MASS INDEX DOCD: CPT | Performed by: INTERNAL MEDICINE

## 2024-08-05 PROCEDURE — 3074F SYST BP LT 130 MM HG: CPT | Performed by: INTERNAL MEDICINE

## 2024-08-05 PROCEDURE — 4010F ACE/ARB THERAPY RXD/TAKEN: CPT | Performed by: INTERNAL MEDICINE

## 2024-08-05 PROCEDURE — 3048F LDL-C <100 MG/DL: CPT | Performed by: INTERNAL MEDICINE

## 2024-08-05 RX ORDER — ONDANSETRON 4 MG/1
4 TABLET, FILM COATED ORAL EVERY 8 HOURS PRN
Qty: 20 TABLET | Refills: 0 | Status: SHIPPED | OUTPATIENT
Start: 2024-08-05 | End: 2024-08-12

## 2024-08-05 ASSESSMENT — ENCOUNTER SYMPTOMS
DEPRESSION: 0
LOSS OF SENSATION IN FEET: 0
OCCASIONAL FEELINGS OF UNSTEADINESS: 0

## 2024-08-06 NOTE — PROGRESS NOTES
"Subjective   Patient ID: Antonio Harrison is a 53 y.o. female who presents for Nausea.    Ms. Antonio Harrison today came here for multiple medical issues.  1. She is getting some nausea, but she is tolerable.  She wants to continue her program of Trulicity in the last two months enough.  She lost 10 pounds, one pound a week.  She is okay with this level.  She is willing to go higher on the dose.  2. Follow-up on other conditions.  Appetite and weight are okay.  No problem.  She gets some nausea.    I have personally reviewed the patient's Past Medical History, Medications, Allergies, Social History, and Family History in the EMR.    Review of Systems   All other systems reviewed and are negative.    Objective   /70   Ht 1.626 m (5' 4\")   Wt 89.8 kg (198 lb)   BMI 33.99 kg/m²     Physical Exam  Vitals reviewed.   Cardiovascular:      Heart sounds: Normal heart sounds, S1 normal and S2 normal. No murmur heard.     No friction rub.   Pulmonary:      Effort: Pulmonary effort is normal.      Breath sounds: Normal breath sounds and air entry.   Abdominal:      Palpations: There is no hepatomegaly, splenomegaly or mass.   Musculoskeletal:      Right lower leg: No edema.      Left lower leg: No edema.   Lymphadenopathy:      Lower Body: No right inguinal adenopathy. No left inguinal adenopathy.   Neurological:      Cranial Nerves: Cranial nerves 2-12 are intact.      Sensory: No sensory deficit.      Motor: Motor function is intact.      Deep Tendon Reflexes: Reflexes are normal and symmetric.     LAB WORK:  Laboratory testing discussed.    Assessment/Plan   Problem List Items Addressed This Visit             ICD-10-CM       Cardiac and Vasculature    Hypertension I10    Relevant Orders    CBC    Urinalysis with Reflex Microscopic    Thyroid Stimulating Hormone    Vitamin B12       Endocrine/Metabolic    Thyroid nodule E04.1    Relevant Orders    Thyroid Stimulating Hormone    Vitamin D deficiency E55.9    Relevant Orders "    Vitamin D 25-Hydroxy,Total (for eval of Vitamin D levels)    Diabetes mellitus (Multi) E11.9    Relevant Medications    dulaglutide 3 mg/0.5 mL pen injector (Start on 8/11/2024)    Other Relevant Orders    Hemoglobin A1C     Other Visit Diagnoses         Codes    Gastroesophageal reflux disease, unspecified whether esophagitis present    -  Primary K21.9    High cholesterol     E78.00    Relevant Orders    Comprehensive Metabolic Panel    Lipid Panel    Nausea     R11.0    Relevant Medications    ondansetron (Zofran) 4 mg tablet        1. Nausea ______ Zofran.  She already had her gallbladder out.  2. I have discussed about avoiding constipation.  She is already on metformin.  Diarrhea okay.  3. Type 2 diabetes and weight.  We are going to go up on the dose of Trulicity.  4. GERD, on PPI.  5. Hypertension, okay.  6. High cholesterol.  Monitor.  7. Follow-up appointment with me in two months, that appointment we will do as a physical.  She is agreeable.    Scribe Attestation  By signing my name below, I, Tan Burrows attest that this documentation has been prepared under the direction and in the presence of Samuel Martinez MD.

## 2024-08-26 DIAGNOSIS — E11.9 TYPE 2 DIABETES MELLITUS WITHOUT COMPLICATION, WITHOUT LONG-TERM CURRENT USE OF INSULIN (MULTI): ICD-10-CM

## 2024-09-12 ENCOUNTER — APPOINTMENT (OUTPATIENT)
Dept: GASTROENTEROLOGY | Facility: CLINIC | Age: 54
End: 2024-09-12
Payer: OTHER GOVERNMENT

## 2024-09-24 ENCOUNTER — LAB (OUTPATIENT)
Dept: LAB | Facility: LAB | Age: 54
End: 2024-09-24
Payer: OTHER GOVERNMENT

## 2024-09-24 DIAGNOSIS — E55.9 VITAMIN D DEFICIENCY: ICD-10-CM

## 2024-09-24 DIAGNOSIS — E78.00 HIGH CHOLESTEROL: ICD-10-CM

## 2024-09-24 DIAGNOSIS — E11.9 TYPE 2 DIABETES MELLITUS WITHOUT COMPLICATION, WITHOUT LONG-TERM CURRENT USE OF INSULIN (MULTI): ICD-10-CM

## 2024-09-24 DIAGNOSIS — I10 PRIMARY HYPERTENSION: ICD-10-CM

## 2024-09-24 DIAGNOSIS — E04.1 THYROID NODULE: ICD-10-CM

## 2024-09-24 LAB
25(OH)D3 SERPL-MCNC: 36 NG/ML (ref 30–100)
ALBUMIN SERPL BCP-MCNC: 4.5 G/DL (ref 3.4–5)
ALP SERPL-CCNC: 54 U/L (ref 33–110)
ALT SERPL W P-5'-P-CCNC: 16 U/L (ref 7–45)
ANION GAP SERPL CALC-SCNC: 17 MMOL/L (ref 10–20)
APPEARANCE UR: CLEAR
AST SERPL W P-5'-P-CCNC: 15 U/L (ref 9–39)
BILIRUB SERPL-MCNC: 2.3 MG/DL (ref 0–1.2)
BILIRUB UR STRIP.AUTO-MCNC: NEGATIVE MG/DL
BUN SERPL-MCNC: 21 MG/DL (ref 6–23)
CALCIUM SERPL-MCNC: 9.3 MG/DL (ref 8.6–10.6)
CHLORIDE SERPL-SCNC: 101 MMOL/L (ref 98–107)
CHOLEST SERPL-MCNC: 158 MG/DL (ref 0–199)
CHOLESTEROL/HDL RATIO: 2.9
CO2 SERPL-SCNC: 21 MMOL/L (ref 21–32)
COLOR UR: NORMAL
CREAT SERPL-MCNC: 1.37 MG/DL (ref 0.5–1.05)
EGFRCR SERPLBLD CKD-EPI 2021: 47 ML/MIN/1.73M*2
ERYTHROCYTE [DISTWIDTH] IN BLOOD BY AUTOMATED COUNT: 13.1 % (ref 11.5–14.5)
EST. AVERAGE GLUCOSE BLD GHB EST-MCNC: 103 MG/DL
GLUCOSE SERPL-MCNC: 96 MG/DL (ref 74–99)
GLUCOSE UR STRIP.AUTO-MCNC: NORMAL MG/DL
HBA1C MFR BLD: 5.2 %
HCT VFR BLD AUTO: 41.6 % (ref 36–46)
HDLC SERPL-MCNC: 54.1 MG/DL
HGB BLD-MCNC: 14.4 G/DL (ref 12–16)
KETONES UR STRIP.AUTO-MCNC: NEGATIVE MG/DL
LDLC SERPL CALC-MCNC: 68 MG/DL
LEUKOCYTE ESTERASE UR QL STRIP.AUTO: NEGATIVE
MCH RBC QN AUTO: 29.2 PG (ref 26–34)
MCHC RBC AUTO-ENTMCNC: 34.6 G/DL (ref 32–36)
MCV RBC AUTO: 84 FL (ref 80–100)
NITRITE UR QL STRIP.AUTO: NEGATIVE
NON HDL CHOLESTEROL: 104 MG/DL (ref 0–149)
NRBC BLD-RTO: 0 /100 WBCS (ref 0–0)
PH UR STRIP.AUTO: 6 [PH]
PLATELET # BLD AUTO: 368 X10*3/UL (ref 150–450)
POTASSIUM SERPL-SCNC: 4.2 MMOL/L (ref 3.5–5.3)
PROT SERPL-MCNC: 6.9 G/DL (ref 6.4–8.2)
PROT UR STRIP.AUTO-MCNC: NEGATIVE MG/DL
RBC # BLD AUTO: 4.93 X10*6/UL (ref 4–5.2)
RBC # UR STRIP.AUTO: NEGATIVE /UL
SODIUM SERPL-SCNC: 135 MMOL/L (ref 136–145)
SP GR UR STRIP.AUTO: 1.01
TRIGL SERPL-MCNC: 180 MG/DL (ref 0–149)
TSH SERPL-ACNC: 1.96 MIU/L (ref 0.44–3.98)
UROBILINOGEN UR STRIP.AUTO-MCNC: NORMAL MG/DL
VIT B12 SERPL-MCNC: 264 PG/ML (ref 211–911)
VLDL: 36 MG/DL (ref 0–40)
WBC # BLD AUTO: 9.3 X10*3/UL (ref 4.4–11.3)

## 2024-09-24 PROCEDURE — 83036 HEMOGLOBIN GLYCOSYLATED A1C: CPT

## 2024-09-24 PROCEDURE — 85027 COMPLETE CBC AUTOMATED: CPT

## 2024-09-24 PROCEDURE — 82607 VITAMIN B-12: CPT

## 2024-09-24 PROCEDURE — 84443 ASSAY THYROID STIM HORMONE: CPT

## 2024-09-24 PROCEDURE — 82306 VITAMIN D 25 HYDROXY: CPT

## 2024-09-24 PROCEDURE — 81003 URINALYSIS AUTO W/O SCOPE: CPT

## 2024-09-24 PROCEDURE — 80061 LIPID PANEL: CPT

## 2024-09-24 PROCEDURE — 80053 COMPREHEN METABOLIC PANEL: CPT

## 2024-09-24 PROCEDURE — 36415 COLL VENOUS BLD VENIPUNCTURE: CPT

## 2024-09-28 ENCOUNTER — APPOINTMENT (OUTPATIENT)
Dept: PRIMARY CARE | Facility: CLINIC | Age: 54
End: 2024-09-28
Payer: OTHER GOVERNMENT

## 2024-10-07 ENCOUNTER — APPOINTMENT (OUTPATIENT)
Dept: PRIMARY CARE | Facility: CLINIC | Age: 54
End: 2024-10-07
Payer: OTHER GOVERNMENT

## 2024-10-14 ENCOUNTER — APPOINTMENT (OUTPATIENT)
Dept: PRIMARY CARE | Facility: CLINIC | Age: 54
End: 2024-10-14
Payer: OTHER GOVERNMENT

## 2024-10-16 ENCOUNTER — OFFICE VISIT (OUTPATIENT)
Dept: PRIMARY CARE | Facility: CLINIC | Age: 54
End: 2024-10-16
Payer: OTHER GOVERNMENT

## 2024-10-16 ENCOUNTER — LAB (OUTPATIENT)
Dept: LAB | Facility: LAB | Age: 54
End: 2024-10-16

## 2024-10-16 VITALS
BODY MASS INDEX: 29.71 KG/M2 | WEIGHT: 174 LBS | HEIGHT: 64 IN | DIASTOLIC BLOOD PRESSURE: 68 MMHG | SYSTOLIC BLOOD PRESSURE: 124 MMHG

## 2024-10-16 DIAGNOSIS — E53.8 VITAMIN B12 DEFICIENCY: ICD-10-CM

## 2024-10-16 DIAGNOSIS — E11.21 DIABETIC NEPHROPATHY ASSOCIATED WITH TYPE 2 DIABETES MELLITUS (MULTI): ICD-10-CM

## 2024-10-16 DIAGNOSIS — I10 HYPERTENSION, UNSPECIFIED TYPE: ICD-10-CM

## 2024-10-16 DIAGNOSIS — R79.89 HIGH SERUM CREATININE: ICD-10-CM

## 2024-10-16 DIAGNOSIS — R79.9 ELEVATED BUN: Primary | ICD-10-CM

## 2024-10-16 DIAGNOSIS — Z23 ENCOUNTER FOR IMMUNIZATION: ICD-10-CM

## 2024-10-16 DIAGNOSIS — E78.00 HIGH CHOLESTEROL: ICD-10-CM

## 2024-10-16 DIAGNOSIS — K22.70 BARRETT'S ESOPHAGUS WITHOUT DYSPLASIA: ICD-10-CM

## 2024-10-16 PROCEDURE — 90656 IIV3 VACC NO PRSV 0.5 ML IM: CPT | Performed by: INTERNAL MEDICINE

## 2024-10-16 PROCEDURE — 99214 OFFICE O/P EST MOD 30 MIN: CPT | Performed by: INTERNAL MEDICINE

## 2024-10-16 PROCEDURE — 3048F LDL-C <100 MG/DL: CPT | Performed by: INTERNAL MEDICINE

## 2024-10-16 PROCEDURE — 80053 COMPREHEN METABOLIC PANEL: CPT

## 2024-10-16 PROCEDURE — 36415 COLL VENOUS BLD VENIPUNCTURE: CPT

## 2024-10-16 PROCEDURE — 96372 THER/PROPH/DIAG INJ SC/IM: CPT | Performed by: INTERNAL MEDICINE

## 2024-10-16 PROCEDURE — 3078F DIAST BP <80 MM HG: CPT | Performed by: INTERNAL MEDICINE

## 2024-10-16 PROCEDURE — 3008F BODY MASS INDEX DOCD: CPT | Performed by: INTERNAL MEDICINE

## 2024-10-16 PROCEDURE — 90471 IMMUNIZATION ADMIN: CPT | Performed by: INTERNAL MEDICINE

## 2024-10-16 PROCEDURE — 3044F HG A1C LEVEL LT 7.0%: CPT | Performed by: INTERNAL MEDICINE

## 2024-10-16 PROCEDURE — 3074F SYST BP LT 130 MM HG: CPT | Performed by: INTERNAL MEDICINE

## 2024-10-16 PROCEDURE — 3060F POS MICROALBUMINURIA REV: CPT | Performed by: INTERNAL MEDICINE

## 2024-10-16 PROCEDURE — 4010F ACE/ARB THERAPY RXD/TAKEN: CPT | Performed by: INTERNAL MEDICINE

## 2024-10-16 PROCEDURE — 84156 ASSAY OF PROTEIN URINE: CPT

## 2024-10-16 PROCEDURE — 82570 ASSAY OF URINE CREATININE: CPT

## 2024-10-16 RX ORDER — CYANOCOBALAMIN 1000 UG/ML
1000 INJECTION, SOLUTION INTRAMUSCULAR; SUBCUTANEOUS ONCE
Status: COMPLETED | OUTPATIENT
Start: 2024-10-16 | End: 2024-10-16

## 2024-10-17 LAB
ALBUMIN SERPL BCP-MCNC: 4.5 G/DL (ref 3.4–5)
ALP SERPL-CCNC: 65 U/L (ref 33–110)
ALT SERPL W P-5'-P-CCNC: 21 U/L (ref 7–45)
ANION GAP SERPL CALC-SCNC: 18 MMOL/L (ref 10–20)
AST SERPL W P-5'-P-CCNC: 19 U/L (ref 9–39)
BILIRUB SERPL-MCNC: 1.8 MG/DL (ref 0–1.2)
BUN SERPL-MCNC: 22 MG/DL (ref 6–23)
CALCIUM SERPL-MCNC: 9.7 MG/DL (ref 8.6–10.6)
CHLORIDE SERPL-SCNC: 103 MMOL/L (ref 98–107)
CO2 SERPL-SCNC: 23 MMOL/L (ref 21–32)
CREAT SERPL-MCNC: 1.42 MG/DL (ref 0.5–1.05)
CREAT UR-MCNC: 406.9 MG/DL (ref 20–320)
EGFRCR SERPLBLD CKD-EPI 2021: 45 ML/MIN/1.73M*2
GLUCOSE SERPL-MCNC: 101 MG/DL (ref 74–99)
POTASSIUM SERPL-SCNC: 4 MMOL/L (ref 3.5–5.3)
PROT SERPL-MCNC: 7.1 G/DL (ref 6.4–8.2)
PROT UR-ACNC: 91 MG/DL (ref 5–24)
PROT/CREAT UR: 0.22 MG/MG CREAT (ref 0–0.17)
SODIUM SERPL-SCNC: 140 MMOL/L (ref 136–145)

## 2024-10-17 NOTE — PROGRESS NOTES
"Subjective   Patient ID: Antonio Harrison is a 53 y.o. female who presents for Follow-up.    Ms. Christy Alvarez today came here for multiple medical issues.  She is a happy person.  1. She told me that she is used to take B12 shot, but sometimes she got busy and she is not taking.  She told me she had upper endoscopy and follow-up on Alvarado's lined up.  2. Follow-up on other conditions.  Appetite and weight are okay.  No problem.  She lost weight.  She likes Trulicity.  She wants to continue.  No side effects.    I have personally reviewed the patient's Past Medical History, Medications, Allergies, Social History, and Family History in the EMR.    Review of Systems   All other systems reviewed and are negative.    Objective   /68   Ht 1.626 m (5' 4\")   Wt 78.9 kg (174 lb)   BMI 29.87 kg/m²     Physical Exam  Vitals reviewed.   Cardiovascular:      Heart sounds: Normal heart sounds, S1 normal and S2 normal. No murmur heard.     No friction rub.   Pulmonary:      Effort: Pulmonary effort is normal.      Breath sounds: Normal breath sounds and air entry.   Abdominal:      Palpations: There is no hepatomegaly, splenomegaly or mass.   Musculoskeletal:      Right lower leg: No edema.      Left lower leg: No edema.   Lymphadenopathy:      Lower Body: No right inguinal adenopathy. No left inguinal adenopathy.   Neurological:      Cranial Nerves: Cranial nerves 2-12 are intact.      Sensory: No sensory deficit.      Motor: Motor function is intact.      Deep Tendon Reflexes: Reflexes are normal and symmetric.     LAB WORK:  Laboratory testing discussed.    Assessment/Plan   Problem List Items Addressed This Visit             ICD-10-CM       Cardiac and Vasculature    Hypertension I10    Relevant Orders    Comprehensive Metabolic Panel    Protein, Urine Random       Gastrointestinal and Abdominal    Alvarado's esophagus K22.70     Other Visit Diagnoses         Codes    Elevated BUN    -  Primary R79.9    Vitamin B12 " deficiency     E53.8    Relevant Medications    cyanocobalamin, vitamin B-12, 1,000 mcg/mL kit    cyanocobalamin (Vitamin B-12) injection 1,000 mcg (Completed)    Encounter for immunization     Z23    Relevant Orders    Flu vaccine, trivalent, preservative free, age 6 months and greater (Fluraix/Fluzone/Flulaval) (Completed)    Diabetic nephropathy associated with type 2 diabetes mellitus (Multi)     E11.21    Relevant Orders    Comprehensive Metabolic Panel    Protein, Urine Random    High serum creatinine     R79.89    High cholesterol     E78.00        1. BUN and creatinine slightly up.  She is on losartan.  I made a note of it.  Drink enough water.  I will check today again and I will keep an eye.  I will continue with losartan.  Drink more water.  2. B12 low.  She has a history of gastric bypass.  Must be on B12 shot once a month.  I will set her up. She wants to come here.  No problem.  3. Type 2 diabetes, wonderful.  She wants to wait, excellent.  4. Hypertension, okay.  5. High cholesterol, excellent.  6. Alvarado's going for upper endoscopy, I agreed.  7. Flu shot today.  Set up for B12 shots and blood work ______ today.  8. I planned to see her in three to four weeks’ time, but together we will keep very close eye on her kidney.  Till then please avoid drugs like ibuprofen.    Scribe Attestation  By signing my name below, IAmie Scribe attest that this documentation has been prepared under the direction and in the presence of Samuel Martinez MD.

## 2024-11-20 ENCOUNTER — OFFICE VISIT (OUTPATIENT)
Dept: PRIMARY CARE | Facility: CLINIC | Age: 54
End: 2024-11-20
Payer: OTHER GOVERNMENT

## 2024-11-20 VITALS
BODY MASS INDEX: 29.53 KG/M2 | WEIGHT: 173 LBS | HEIGHT: 64 IN | DIASTOLIC BLOOD PRESSURE: 70 MMHG | SYSTOLIC BLOOD PRESSURE: 126 MMHG

## 2024-11-20 DIAGNOSIS — N18.9 ACUTE RENAL FAILURE SUPERIMPOSED ON CHRONIC KIDNEY DISEASE, UNSPECIFIED ACUTE RENAL FAILURE TYPE, UNSPECIFIED CKD STAGE (CMS-HCC): Primary | ICD-10-CM

## 2024-11-20 DIAGNOSIS — N19 RENAL FAILURE, UNSPECIFIED CHRONICITY: ICD-10-CM

## 2024-11-20 DIAGNOSIS — E78.00 HIGH CHOLESTEROL: ICD-10-CM

## 2024-11-20 DIAGNOSIS — K22.70 BARRETT'S ESOPHAGUS WITHOUT DYSPLASIA: ICD-10-CM

## 2024-11-20 DIAGNOSIS — E53.8 VITAMIN B12 DEFICIENCY: ICD-10-CM

## 2024-11-20 DIAGNOSIS — E11.9 TYPE 2 DIABETES MELLITUS WITHOUT COMPLICATION, WITHOUT LONG-TERM CURRENT USE OF INSULIN (MULTI): ICD-10-CM

## 2024-11-20 DIAGNOSIS — N17.9 ACUTE RENAL FAILURE SUPERIMPOSED ON CHRONIC KIDNEY DISEASE, UNSPECIFIED ACUTE RENAL FAILURE TYPE, UNSPECIFIED CKD STAGE (CMS-HCC): Primary | ICD-10-CM

## 2024-11-20 DIAGNOSIS — I10 HYPERTENSION, UNSPECIFIED TYPE: ICD-10-CM

## 2024-11-20 PROCEDURE — 96372 THER/PROPH/DIAG INJ SC/IM: CPT | Performed by: INTERNAL MEDICINE

## 2024-11-20 PROCEDURE — 3048F LDL-C <100 MG/DL: CPT | Performed by: INTERNAL MEDICINE

## 2024-11-20 PROCEDURE — 3060F POS MICROALBUMINURIA REV: CPT | Performed by: INTERNAL MEDICINE

## 2024-11-20 PROCEDURE — 3074F SYST BP LT 130 MM HG: CPT | Performed by: INTERNAL MEDICINE

## 2024-11-20 PROCEDURE — 4010F ACE/ARB THERAPY RXD/TAKEN: CPT | Performed by: INTERNAL MEDICINE

## 2024-11-20 PROCEDURE — 3078F DIAST BP <80 MM HG: CPT | Performed by: INTERNAL MEDICINE

## 2024-11-20 PROCEDURE — 99214 OFFICE O/P EST MOD 30 MIN: CPT | Performed by: INTERNAL MEDICINE

## 2024-11-20 PROCEDURE — 3044F HG A1C LEVEL LT 7.0%: CPT | Performed by: INTERNAL MEDICINE

## 2024-11-20 PROCEDURE — 3008F BODY MASS INDEX DOCD: CPT | Performed by: INTERNAL MEDICINE

## 2024-11-20 RX ORDER — CYANOCOBALAMIN 1000 UG/ML
1000 INJECTION, SOLUTION INTRAMUSCULAR; SUBCUTANEOUS ONCE
Status: COMPLETED | OUTPATIENT
Start: 2024-11-20 | End: 2024-11-20

## 2024-11-20 RX ORDER — DULAGLUTIDE 1.5 MG/.5ML
1.5 INJECTION, SOLUTION SUBCUTANEOUS
Qty: 2 ML | Refills: 2 | Status: SHIPPED | OUTPATIENT
Start: 2024-11-24

## 2024-11-20 ASSESSMENT — ENCOUNTER SYMPTOMS
OCCASIONAL FEELINGS OF UNSTEADINESS: 0
LOSS OF SENSATION IN FEET: 0
DEPRESSION: 0

## 2024-11-21 NOTE — PROGRESS NOTES
"Subjective   Patient ID: Antonio Harrison is a 54 y.o. female who presents for Follow-up.    Ms. Antonio Harrison today came here for multiple medical issues.  She told me that she gets a lot of diarrhea and that causing her dehydration.  She tried to keep hydrated.  Trulicity is helping to lose weight.  She is concerned.  She is concerned with her kidney too.  Appetite and weight are okay.  No problem.    I have personally reviewed the patient's Past Medical History, Medications, Allergies, Social History, and Family History in the EMR.    Review of Systems   All other systems reviewed and are negative.    Objective   /70   Ht 1.626 m (5' 4\")   Wt 78.5 kg (173 lb)   BMI 29.70 kg/m²     Physical Exam  Vitals reviewed.   Cardiovascular:      Heart sounds: Normal heart sounds, S1 normal and S2 normal. No murmur heard.     No friction rub.   Pulmonary:      Effort: Pulmonary effort is normal.      Breath sounds: Normal breath sounds and air entry.   Abdominal:      Palpations: There is no hepatomegaly, splenomegaly or mass.   Musculoskeletal:      Right lower leg: No edema.      Left lower leg: No edema.   Lymphadenopathy:      Lower Body: No right inguinal adenopathy. No left inguinal adenopathy.   Neurological:      Cranial Nerves: Cranial nerves 2-12 are intact.      Sensory: No sensory deficit.      Motor: Motor function is intact.      Deep Tendon Reflexes: Reflexes are normal and symmetric.     LAB WORK:  Laboratory testing discussed.    Assessment/Plan   Problem List Items Addressed This Visit             ICD-10-CM       Cardiac and Vasculature    Hypertension I10       Endocrine/Metabolic    Diabetes mellitus (Multi) E11.9    Relevant Medications    dulaglutide (Trulicity) 1.5 mg/0.5 mL pen injector injection (Start on 11/24/2024)       Gastrointestinal and Abdominal    Alvarado's esophagus K22.70    Relevant Orders    Referral to Gastroenterology     Other Visit Diagnoses         Codes    Acute renal failure " superimposed on chronic kidney disease, unspecified acute renal failure type, unspecified CKD stage (CMS-[facility])    -  Primary N17.9, N18.9    Vitamin B12 deficiency     E53.8    Relevant Medications    cyanocobalamin (Vitamin B-12) injection 1,000 mcg (Completed)    Renal failure, unspecified chronicity     N19    Relevant Orders    Comprehensive Metabolic Panel    Creatinine, 24 Hour Urine    Protein, Total 24 Hour Urine    US renal complete    High cholesterol     E78.00        1. Acute-on-chronic renal failure.  I am not sure whether it is true.  It could be pre-renal because of dehydration.  We are going to reduce metformin to 500 mg twice a day.  We decided to keep losartan dose same.  2. Chronic renal failure.  The patient is diabetic and blood pressure.  I decided to do ultrasound of the kidney and bladder, 24-hour urine test two weeks from now to check creatinine clearance and proteinuria. Once again I decided not to stop losartan at this stage, she agreed.  3. Type 2 diabetes.  We are going to reduce metformin in half, but Trulicity remain same.  4. Hypertension, okay.  5. Cholesterol.  Monitor.  6. I planned to see her in middle of December after all these testing.  7. I urged her to keep hydrated.  8. The patient has Alvarado's esophagus, biopsy confirmed. She will benefit from Dr. Mendieta’s new study sponsored by [facility].  Monitor.  9. Continue to follow.    Scribe Attestation  By signing my name below, IAmie Scribe attest that this documentation has been prepared under the direction and in the presence of Samuel Martinez MD.

## 2024-12-09 ENCOUNTER — HOSPITAL ENCOUNTER (EMERGENCY)
Facility: HOSPITAL | Age: 54
Discharge: HOME | End: 2024-12-09
Attending: EMERGENCY MEDICINE
Payer: OTHER GOVERNMENT

## 2024-12-09 ENCOUNTER — APPOINTMENT (OUTPATIENT)
Dept: RADIOLOGY | Facility: HOSPITAL | Age: 54
End: 2024-12-09
Payer: OTHER GOVERNMENT

## 2024-12-09 VITALS
WEIGHT: 170 LBS | SYSTOLIC BLOOD PRESSURE: 104 MMHG | DIASTOLIC BLOOD PRESSURE: 76 MMHG | HEIGHT: 63 IN | OXYGEN SATURATION: 100 % | RESPIRATION RATE: 18 BRPM | TEMPERATURE: 96.8 F | HEART RATE: 100 BPM | BODY MASS INDEX: 30.12 KG/M2

## 2024-12-09 DIAGNOSIS — N20.0 RIGHT KIDNEY STONE: Primary | ICD-10-CM

## 2024-12-09 DIAGNOSIS — R10.9 RIGHT FLANK PAIN: ICD-10-CM

## 2024-12-09 LAB
ALBUMIN SERPL BCP-MCNC: 4 G/DL (ref 3.4–5)
ALP SERPL-CCNC: 93 U/L (ref 33–110)
ALT SERPL W P-5'-P-CCNC: 32 U/L (ref 7–45)
ANION GAP SERPL CALC-SCNC: 22 MMOL/L (ref 10–20)
APPEARANCE UR: ABNORMAL
AST SERPL W P-5'-P-CCNC: 21 U/L (ref 9–39)
BASOPHILS # BLD AUTO: 0.05 X10*3/UL (ref 0–0.1)
BASOPHILS NFR BLD AUTO: 0.6 %
BILIRUB SERPL-MCNC: 1.2 MG/DL (ref 0–1.2)
BILIRUB UR STRIP.AUTO-MCNC: ABNORMAL MG/DL
BUN SERPL-MCNC: 16 MG/DL (ref 6–23)
CALCIUM SERPL-MCNC: 9.1 MG/DL (ref 8.6–10.3)
CHLORIDE SERPL-SCNC: 102 MMOL/L (ref 98–107)
CO2 SERPL-SCNC: 19 MMOL/L (ref 21–32)
COLOR UR: ABNORMAL
CREAT SERPL-MCNC: 1.24 MG/DL (ref 0.5–1.05)
EGFRCR SERPLBLD CKD-EPI 2021: 52 ML/MIN/1.73M*2
EOSINOPHIL # BLD AUTO: 0.09 X10*3/UL (ref 0–0.7)
EOSINOPHIL NFR BLD AUTO: 1.1 %
ERYTHROCYTE [DISTWIDTH] IN BLOOD BY AUTOMATED COUNT: 12.7 % (ref 11.5–14.5)
GLUCOSE SERPL-MCNC: 106 MG/DL (ref 74–99)
GLUCOSE UR STRIP.AUTO-MCNC: NORMAL MG/DL
HCT VFR BLD AUTO: 38.2 % (ref 36–46)
HGB BLD-MCNC: 13.5 G/DL (ref 12–16)
HYALINE CASTS #/AREA URNS AUTO: ABNORMAL /LPF
IMM GRANULOCYTES # BLD AUTO: 0.03 X10*3/UL (ref 0–0.7)
IMM GRANULOCYTES NFR BLD AUTO: 0.4 % (ref 0–0.9)
KETONES UR STRIP.AUTO-MCNC: NEGATIVE MG/DL
LEUKOCYTE ESTERASE UR QL STRIP.AUTO: NEGATIVE
LYMPHOCYTES # BLD AUTO: 1.4 X10*3/UL (ref 1.2–4.8)
LYMPHOCYTES NFR BLD AUTO: 16.5 %
MAGNESIUM SERPL-MCNC: 1.03 MG/DL (ref 1.6–2.4)
MCH RBC QN AUTO: 29 PG (ref 26–34)
MCHC RBC AUTO-ENTMCNC: 35.3 G/DL (ref 32–36)
MCV RBC AUTO: 82 FL (ref 80–100)
MONOCYTES # BLD AUTO: 0.36 X10*3/UL (ref 0.1–1)
MONOCYTES NFR BLD AUTO: 4.3 %
MUCOUS THREADS #/AREA URNS AUTO: ABNORMAL /LPF
NEUTROPHILS # BLD AUTO: 6.53 X10*3/UL (ref 1.2–7.7)
NEUTROPHILS NFR BLD AUTO: 77.1 %
NITRITE UR QL STRIP.AUTO: ABNORMAL
NRBC BLD-RTO: 0 /100 WBCS (ref 0–0)
PH UR STRIP.AUTO: 6 [PH]
PLATELET # BLD AUTO: 317 X10*3/UL (ref 150–450)
POTASSIUM SERPL-SCNC: 3.9 MMOL/L (ref 3.5–5.3)
PROT SERPL-MCNC: 7.1 G/DL (ref 6.4–8.2)
PROT UR STRIP.AUTO-MCNC: ABNORMAL MG/DL
RBC # BLD AUTO: 4.66 X10*6/UL (ref 4–5.2)
RBC # UR STRIP.AUTO: NEGATIVE /UL
RBC #/AREA URNS AUTO: ABNORMAL /HPF
SODIUM SERPL-SCNC: 139 MMOL/L (ref 136–145)
SP GR UR STRIP.AUTO: 1.02
SQUAMOUS #/AREA URNS AUTO: ABNORMAL /HPF
UROBILINOGEN UR STRIP.AUTO-MCNC: ABNORMAL MG/DL
WBC # BLD AUTO: 8.5 X10*3/UL (ref 4.4–11.3)
WBC #/AREA URNS AUTO: ABNORMAL /HPF
WBC CLUMPS #/AREA URNS AUTO: ABNORMAL /HPF

## 2024-12-09 PROCEDURE — 83735 ASSAY OF MAGNESIUM: CPT | Performed by: EMERGENCY MEDICINE

## 2024-12-09 PROCEDURE — 2500000002 HC RX 250 W HCPCS SELF ADMINISTERED DRUGS (ALT 637 FOR MEDICARE OP, ALT 636 FOR OP/ED)

## 2024-12-09 PROCEDURE — 85025 COMPLETE CBC W/AUTO DIFF WBC: CPT | Performed by: EMERGENCY MEDICINE

## 2024-12-09 PROCEDURE — 2500000004 HC RX 250 GENERAL PHARMACY W/ HCPCS (ALT 636 FOR OP/ED): Performed by: EMERGENCY MEDICINE

## 2024-12-09 PROCEDURE — 74177 CT ABD & PELVIS W/CONTRAST: CPT | Performed by: RADIOLOGY

## 2024-12-09 PROCEDURE — 96361 HYDRATE IV INFUSION ADD-ON: CPT

## 2024-12-09 PROCEDURE — 81001 URINALYSIS AUTO W/SCOPE: CPT | Performed by: EMERGENCY MEDICINE

## 2024-12-09 PROCEDURE — 96374 THER/PROPH/DIAG INJ IV PUSH: CPT

## 2024-12-09 PROCEDURE — 74177 CT ABD & PELVIS W/CONTRAST: CPT

## 2024-12-09 PROCEDURE — 2500000004 HC RX 250 GENERAL PHARMACY W/ HCPCS (ALT 636 FOR OP/ED)

## 2024-12-09 PROCEDURE — 87086 URINE CULTURE/COLONY COUNT: CPT | Mod: GEALAB | Performed by: EMERGENCY MEDICINE

## 2024-12-09 PROCEDURE — 96375 TX/PRO/DX INJ NEW DRUG ADDON: CPT

## 2024-12-09 PROCEDURE — 96376 TX/PRO/DX INJ SAME DRUG ADON: CPT

## 2024-12-09 PROCEDURE — 80053 COMPREHEN METABOLIC PANEL: CPT | Performed by: EMERGENCY MEDICINE

## 2024-12-09 PROCEDURE — 36415 COLL VENOUS BLD VENIPUNCTURE: CPT | Performed by: EMERGENCY MEDICINE

## 2024-12-09 PROCEDURE — 2550000001 HC RX 255 CONTRASTS: Performed by: EMERGENCY MEDICINE

## 2024-12-09 PROCEDURE — 99285 EMERGENCY DEPT VISIT HI MDM: CPT | Mod: 25 | Performed by: EMERGENCY MEDICINE

## 2024-12-09 RX ORDER — TAMSULOSIN HYDROCHLORIDE 0.4 MG/1
0.4 CAPSULE ORAL DAILY
Qty: 3 CAPSULE | Refills: 0 | Status: SHIPPED | OUTPATIENT
Start: 2024-12-09 | End: 2024-12-12

## 2024-12-09 RX ORDER — ONDANSETRON HYDROCHLORIDE 2 MG/ML
4 INJECTION, SOLUTION INTRAVENOUS ONCE
Status: COMPLETED | OUTPATIENT
Start: 2024-12-09 | End: 2024-12-09

## 2024-12-09 RX ORDER — KETOROLAC TROMETHAMINE 15 MG/ML
15 INJECTION, SOLUTION INTRAMUSCULAR; INTRAVENOUS ONCE
Status: COMPLETED | OUTPATIENT
Start: 2024-12-09 | End: 2024-12-09

## 2024-12-09 RX ORDER — OXYCODONE AND ACETAMINOPHEN 5; 325 MG/1; MG/1
1 TABLET ORAL EVERY 8 HOURS PRN
Qty: 5 TABLET | Refills: 0 | Status: SHIPPED | OUTPATIENT
Start: 2024-12-09 | End: 2024-12-12

## 2024-12-09 RX ORDER — HYDROMORPHONE HYDROCHLORIDE 1 MG/ML
1 INJECTION, SOLUTION INTRAMUSCULAR; INTRAVENOUS; SUBCUTANEOUS ONCE
Status: COMPLETED | OUTPATIENT
Start: 2024-12-09 | End: 2024-12-09

## 2024-12-09 RX ORDER — TAMSULOSIN HYDROCHLORIDE 0.4 MG/1
0.4 CAPSULE ORAL DAILY
Status: DISCONTINUED | OUTPATIENT
Start: 2024-12-09 | End: 2024-12-09 | Stop reason: HOSPADM

## 2024-12-09 RX ORDER — SULFAMETHOXAZOLE AND TRIMETHOPRIM 800; 160 MG/1; MG/1
1 TABLET ORAL EVERY 12 HOURS
Qty: 10 TABLET | Refills: 0 | Status: SHIPPED | OUTPATIENT
Start: 2024-12-09 | End: 2024-12-14

## 2024-12-09 RX ORDER — METOCLOPRAMIDE HYDROCHLORIDE 5 MG/ML
10 INJECTION INTRAMUSCULAR; INTRAVENOUS ONCE
Status: COMPLETED | OUTPATIENT
Start: 2024-12-09 | End: 2024-12-09

## 2024-12-09 RX ORDER — MAGNESIUM SULFATE HEPTAHYDRATE 40 MG/ML
2 INJECTION, SOLUTION INTRAVENOUS ONCE
Status: COMPLETED | OUTPATIENT
Start: 2024-12-09 | End: 2024-12-09

## 2024-12-09 ASSESSMENT — PAIN SCALES - GENERAL
PAINLEVEL_OUTOF10: 8
PAINLEVEL_OUTOF10: 10 - WORST POSSIBLE PAIN

## 2024-12-09 ASSESSMENT — COLUMBIA-SUICIDE SEVERITY RATING SCALE - C-SSRS
2. HAVE YOU ACTUALLY HAD ANY THOUGHTS OF KILLING YOURSELF?: NO
1. IN THE PAST MONTH, HAVE YOU WISHED YOU WERE DEAD OR WISHED YOU COULD GO TO SLEEP AND NOT WAKE UP?: NO
6. HAVE YOU EVER DONE ANYTHING, STARTED TO DO ANYTHING, OR PREPARED TO DO ANYTHING TO END YOUR LIFE?: NO

## 2024-12-09 ASSESSMENT — PAIN DESCRIPTION - PAIN TYPE: TYPE: ACUTE PAIN

## 2024-12-09 ASSESSMENT — PAIN - FUNCTIONAL ASSESSMENT: PAIN_FUNCTIONAL_ASSESSMENT: 0-10

## 2024-12-09 NOTE — ED PROVIDER NOTES
The patient was seen by the midlevel/resident.  I have personally saw the patient and made/approved the management plan and take responsibility for the patient management.  I reviewed the EKG's (when done) and agree with the interpretation.  I have seen and examined the patient; agree with the workup, evaluation, MDM, and diagnosis.  The care plan has been discussed with the midlevel/resident; I have reviewed the note and agree with the documented findings.     Presents with severe right-sided flank pain similar to previous kidney stones.  History comes from patient and her daughter in the room.  EMR was also reviewed.  The pain wraps around and she has nausea as well.  She was given Toradol and Zofran in the ED without much improvement a little bit.  Plan is to give her some Dilaudid and some more additional Zofran awaiting CT scan labs. Found to have a large kidney stone 5mm causing her pain. She is improved. Shared decision making employed. She would like to go home and follow up as an outpatient.   Diagnoses as of 12/10/24 0545   Right flank pain   Right kidney stone     MD Bebeto Mccrary MD  12/09/24 1747       Bebeto Cortez MD  12/10/24 0545

## 2024-12-09 NOTE — ED NOTES
Pt arrives with c/o right sided flank pain, n/v and urinary urgency beginning this AM. PT with hx of kidney stones. Pt reports symptoms similar to previous kidney stones. Pt currently grimacing and guarding.     Timothy Arnett RN  12/09/24 0601

## 2024-12-09 NOTE — ED PROVIDER NOTES
Emergency Department Provider Note        History of Present Illness     History provided by: Patient  Limitations to History: None  External Records Reviewed with Brief Summary: I reviewed prior ED visits, Care Everywhere, discharge summaries and outpatient records as appropriate.       HPI:  Antonio Harrison is a 54 y.o. female presenting to the emergency department with concern for right flank pain.  Patient has previous history of kidney stones and reports that symptoms today feel slightly worse but otherwise at baseline to previous kidney stone she is experience.  States it started this morning around 630, endorsing 10 out of 10 right flank pain that radiates and wraps around into her groin.  Positive nausea and dry heaving but denies active vomiting.  Also reporting increased urgency but denies any dysuria or hematuria.  Denies fever, chills, muscle aches, headache or vision changes.  No chest pain or shortness of breath    Physical Exam   Triage vitals:  T 36 °C (96.8 °F)  HR (!) 106  BP (!) 150/93  RR (!) 30  O2 100 % None (Room air)    General: Awake, alert, in no acute distress, appears very uncomfortable, hyperventilating, rocking back and forth  Eyes: Gaze conjugate.  No scleral icterus or injection  HENT: Normo-cephalic, atraumatic. No stridor  CV: Tachycardic rate, regular rhythm. Radial pulses 2+ bilaterally  Resp: Breathing non-labored, speaking in full sentences.  Clear to auscultation bilaterally  GI: Soft, non-distended, non-tender. No rebound or guarding.  Positive right CVA tenderness with guarding on palpation  MSK/Extremities: No gross bony deformities. Moving all extremities  Skin: Warm. Appropriate color  Neuro: Alert. Oriented. Face symmetric. Speech is fluent.  Gross strength and sensation intact in b/l UE and LEs  Psych: Appropriate mood and affect    Medical Decision Making & ED Course   Medical Decision Makin y.o. female senting to the emergency department with flank pain  suspicious for kidney stone.  On physical exam, initially, patient is very uncomfortable appearing, tachycardic, hyperventilating secondary to pain and actively retching.  Started on IV fluids, given Toradol and Zofran for symptomatic management however after roughly 30 minutes of observation she had no improvement from pain so we escalated to Dilaudid IV and a second dose of Zofran.  Her physical exam is concerning for nephrolithiasis as she confirms her symptoms today feel consistent with previous episodes.  Blood work obtained, CBC shows no leukocytosis, CMP shows a DAVID, electrolytes otherwise stable, is receiving IV fluids.  Also noted to have a magnesium of 1.03 and this was replaced in the emergency department.  CT of the abdomen pelvis shows a right 5 mm stone at the right UVJ with mild hydronephrosis.  She also has 3 stones on the left ureter that are nonobstructive.  On reevaluation, patient appears improved but is still endorsing discomfort.  Given IV Reglan and a second dose of Dilaudid and a urinalysis finally collected.  On final evaluation, patient reports she feels significantly include and is requesting to be discharged.  Has close follow-up and understands she should return to the emergency department should she develop fever chills, worsening pain, vomiting.  Patient discharged with Flomax and a short course of pain medication.  Patient encouraged to increase p.o. intake for the next few days until the stone passes.    Urinalysis resulted after patient was discharged and urinalysis looks significant for nitrates and white blood cells on urinalysis, no evidence of bacteria and this could be a borderline urinary tract infection, in the setting of a kidney stone, I called in antibiotics for the patient and left a message informing the patient of her urinalysis results and a prescription at her pharmacy.      ----       Social Determinants of Health which Significantly Impact Care: None identified      EKG Independent Interpretation: EKG not obtained    Independent Result Review and Interpretation: Relevant laboratory and radiographic results were reviewed and independently interpreted by myself.  As necessary, they are commented on in the ED Course.    Chronic conditions affecting the patient's care: As documented above in MDM    The patient was discussed with the following consultants/services: None    Care Considerations: As documented above in [facility]    ED Course:  Diagnoses as of 12/09/24 2020   Right flank pain   Right kidney stone     Disposition   As a result of the work-up, the patient was discharged home.  she was informed of her diagnosis and instructed to come back with any concerns or worsening of condition.  she and was agreeable to the plan as discussed above.  she was given the opportunity to ask questions.  All of the patient's questions were answered.    Procedures   Procedures    Patient seen and discussed with ED attending physician.    Batsheva Anna DO  Emergency Medicine       Batsheva Anna DO  Resident  12/09/24 2020

## 2024-12-09 NOTE — ED TRIAGE NOTES
Pt BIB POV from home w/ c/o right flank px 10/10. Similar to previous kidney stones. N/dry heaving. Urgency but denies dysuria or hematuria. Px started this am.

## 2024-12-10 LAB — BACTERIA UR CULT: NO GROWTH

## 2024-12-12 ENCOUNTER — APPOINTMENT (OUTPATIENT)
Dept: GASTROENTEROLOGY | Facility: CLINIC | Age: 54
End: 2024-12-12
Payer: OTHER GOVERNMENT

## 2024-12-18 NOTE — PROGRESS NOTES
"Subjective   Patient ID: Antonio Harrison is a 53 y.o. female who presents for multiple medical issues..    Ms. Alvarez today came here for multiple medical issues.  1. New problem, left hand thumb just under terminal PIP joint pain and swelling.  No fall, trauma or injury.  She is a right-handed person.  2. Diabetes.  She tried everything, but cannot lose weight.  Janumet controlling the diabetes.  She came here for follow-up on blood work.  Her job is desk job.    I have personally reviewed the patient's Past Medical History, Medications, Allergies, Social History, and Family History in the EMR.    Review of Systems   All other systems reviewed and are negative.    Objective   /70   Ht 1.626 m (5' 4\")   Wt 89.4 kg (197 lb)   BMI 33.81 kg/m²     Physical Exam  Vitals reviewed.   Cardiovascular:      Heart sounds: Normal heart sounds, S1 normal and S2 normal. No murmur heard.     No friction rub.   Pulmonary:      Effort: Pulmonary effort is normal.      Breath sounds: Normal breath sounds and air entry.   Abdominal:      Palpations: There is no hepatomegaly, splenomegaly or mass.   Musculoskeletal:      Right lower leg: No edema.      Left lower leg: No edema.      Comments: Left hand thumb PIP joint looks like bony osteosis.  I do not think it is infected.   Lymphadenopathy:      Lower Body: No right inguinal adenopathy. No left inguinal adenopathy.   Neurological:      Cranial Nerves: Cranial nerves 2-12 are intact.      Sensory: No sensory deficit.      Motor: Motor function is intact.      Deep Tendon Reflexes: Reflexes are normal and symmetric.     LAB WORK:  Laboratory testing discussed    Assessment/Plan   Problem List Items Addressed This Visit             ICD-10-CM       Cardiac and Vasculature    Hypertension I10    Relevant Medications    metoprolol succinate XL (Toprol-XL) 50 mg 24 hr tablet    losartan (Cozaar) 100 mg tablet       Endocrine/Metabolic    Diabetes (Multi) E11.9    Relevant Medications " [facility] Alert Outreach Telephone Call Attempt     Patient is being outreached by the Care Transitions Program for a clinical alert from the [facility] monitoring program.     Call Attempt Date: 12/18/2024    Call Attempt: First Attempt    TCM completed 12/5/24      SITagliptin phos-metformin (Janumet XR) 50-1,000 mg tablet, ER multiphase 24 hr    hydrOXYzine HCL (Atarax) 25 mg tablet    metFORMIN (Glucophage) 1,000 mg tablet    dulaglutide (Trulicity) 0.75 mg/0.5 mL pen injector (Start on 6/30/2024)    Diabetes mellitus (Multi) E11.9    Relevant Medications    metFORMIN (Glucophage) 1,000 mg tablet    dulaglutide (Trulicity) 0.75 mg/0.5 mL pen injector (Start on 6/30/2024)     Other Visit Diagnoses         Codes    High cholesterol    -  Primary E78.00    Thumb pain, unspecified laterality     M79.646    Relevant Orders    Referral to Orthopaedic Surgery    Thyroid disorder screening     Z13.29    Acquired mallet thumb of left hand     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 Bony osteosis.  Soak in a hot water.  Motrin, Flexeril and Voltaren gel.  Refer to hand surgeon for possible steroid shot.  2. Type 2 diabetes.  Hemoglobin A1c is okay.  Sugars still go high.  She cannot lose weight.  We discussed in length.  Stop Janumet.  We are going to put metformin 1 gm twice a day and replace it depending upon insurance into the either Ozempic or similar.  She is agreeable.  3. Hypertension, okay.  4. High cholesterol, okay.  5. Thyroid.  Monitor.  6. Follow up in four weeks.    Scribe Attestation  By signing my name below, Amie CHAVEZ, Tan attest that this documentation has been prepared under the direction and in the presence of Samuel Martinez MD.

## 2024-12-20 DIAGNOSIS — E11.9 TYPE 2 DIABETES MELLITUS WITHOUT COMPLICATION, WITHOUT LONG-TERM CURRENT USE OF INSULIN (MULTI): ICD-10-CM

## 2024-12-20 RX ORDER — DULAGLUTIDE 3 MG/.5ML
3 INJECTION, SOLUTION SUBCUTANEOUS WEEKLY
Qty: 16 EACH | Refills: 0 | Status: SHIPPED | OUTPATIENT
Start: 2024-12-20 | End: 2025-01-19

## 2024-12-23 DIAGNOSIS — I10 PRIMARY HYPERTENSION: ICD-10-CM

## 2024-12-23 RX ORDER — LOSARTAN POTASSIUM 100 MG/1
100 TABLET ORAL DAILY
Qty: 90 TABLET | Refills: 3 | Status: SHIPPED | OUTPATIENT
Start: 2024-12-23

## 2024-12-30 ENCOUNTER — APPOINTMENT (OUTPATIENT)
Dept: RADIOLOGY | Facility: HOSPITAL | Age: 54
End: 2024-12-30
Payer: OTHER GOVERNMENT

## 2025-01-08 ENCOUNTER — APPOINTMENT (OUTPATIENT)
Dept: PRIMARY CARE | Facility: CLINIC | Age: 55
End: 2025-01-08
Payer: OTHER GOVERNMENT

## 2025-01-10 ENCOUNTER — APPOINTMENT (OUTPATIENT)
Dept: RADIOLOGY | Facility: HOSPITAL | Age: 55
End: 2025-01-10
Payer: OTHER GOVERNMENT

## 2025-01-13 ENCOUNTER — APPOINTMENT (OUTPATIENT)
Dept: PRIMARY CARE | Facility: CLINIC | Age: 55
End: 2025-01-13
Payer: OTHER GOVERNMENT

## 2025-01-20 ENCOUNTER — APPOINTMENT (OUTPATIENT)
Dept: RADIOLOGY | Facility: HOSPITAL | Age: 55
End: 2025-01-20
Payer: OTHER GOVERNMENT

## 2025-01-20 ENCOUNTER — APPOINTMENT (OUTPATIENT)
Dept: PRIMARY CARE | Facility: CLINIC | Age: 55
End: 2025-01-20
Payer: OTHER GOVERNMENT

## 2025-01-24 ENCOUNTER — APPOINTMENT (OUTPATIENT)
Dept: PRIMARY CARE | Facility: CLINIC | Age: 55
End: 2025-01-24
Payer: OTHER GOVERNMENT

## 2025-01-27 ENCOUNTER — APPOINTMENT (OUTPATIENT)
Dept: RADIOLOGY | Facility: HOSPITAL | Age: 55
End: 2025-01-27
Payer: OTHER GOVERNMENT

## 2025-01-29 ENCOUNTER — APPOINTMENT (OUTPATIENT)
Dept: PRIMARY CARE | Facility: CLINIC | Age: 55
End: 2025-01-29
Payer: OTHER GOVERNMENT

## 2025-02-03 ENCOUNTER — APPOINTMENT (OUTPATIENT)
Dept: RADIOLOGY | Facility: HOSPITAL | Age: 55
End: 2025-02-03
Payer: OTHER GOVERNMENT

## 2025-02-10 ENCOUNTER — APPOINTMENT (OUTPATIENT)
Dept: PRIMARY CARE | Facility: CLINIC | Age: 55
End: 2025-02-10
Payer: OTHER GOVERNMENT

## 2025-02-21 ENCOUNTER — APPOINTMENT (OUTPATIENT)
Dept: PRIMARY CARE | Facility: CLINIC | Age: 55
End: 2025-02-21
Payer: OTHER GOVERNMENT

## 2025-02-24 ENCOUNTER — APPOINTMENT (OUTPATIENT)
Dept: PRIMARY CARE | Facility: CLINIC | Age: 55
End: 2025-02-24
Payer: OTHER GOVERNMENT

## 2025-02-28 DIAGNOSIS — I10 PRIMARY HYPERTENSION: ICD-10-CM

## 2025-02-28 DIAGNOSIS — E11.9 TYPE 2 DIABETES MELLITUS WITHOUT COMPLICATION, WITHOUT LONG-TERM CURRENT USE OF INSULIN (MULTI): ICD-10-CM

## 2025-02-28 RX ORDER — METFORMIN HYDROCHLORIDE 1000 MG/1
TABLET ORAL
Qty: 180 TABLET | Refills: 0 | Status: SHIPPED | OUTPATIENT
Start: 2025-02-28

## 2025-02-28 RX ORDER — METOPROLOL SUCCINATE 50 MG/1
50 TABLET, EXTENDED RELEASE ORAL DAILY
Qty: 90 TABLET | Refills: 0 | Status: SHIPPED | OUTPATIENT
Start: 2025-02-28

## 2025-04-28 ENCOUNTER — APPOINTMENT (OUTPATIENT)
Dept: PRIMARY CARE | Facility: CLINIC | Age: 55
End: 2025-04-28
Payer: OTHER GOVERNMENT

## 2025-05-02 ENCOUNTER — APPOINTMENT (OUTPATIENT)
Dept: PRIMARY CARE | Facility: CLINIC | Age: 55
End: 2025-05-02
Payer: OTHER GOVERNMENT

## 2025-05-09 ENCOUNTER — APPOINTMENT (OUTPATIENT)
Dept: PRIMARY CARE | Facility: CLINIC | Age: 55
End: 2025-05-09
Payer: OTHER GOVERNMENT

## 2025-05-09 VITALS
WEIGHT: 171 LBS | BODY MASS INDEX: 30.3 KG/M2 | DIASTOLIC BLOOD PRESSURE: 70 MMHG | SYSTOLIC BLOOD PRESSURE: 122 MMHG | HEIGHT: 63 IN

## 2025-05-09 DIAGNOSIS — I10 HYPERTENSION, UNSPECIFIED TYPE: ICD-10-CM

## 2025-05-09 DIAGNOSIS — E53.8 VITAMIN B12 DEFICIENCY: ICD-10-CM

## 2025-05-09 DIAGNOSIS — E78.00 HIGH CHOLESTEROL: ICD-10-CM

## 2025-05-09 DIAGNOSIS — R59.0 ABDOMINAL LYMPHADENOPATHY: ICD-10-CM

## 2025-05-09 DIAGNOSIS — R53.83 OTHER FATIGUE: ICD-10-CM

## 2025-05-09 DIAGNOSIS — E11.9 TYPE 2 DIABETES MELLITUS WITHOUT COMPLICATION, WITHOUT LONG-TERM CURRENT USE OF INSULIN: Primary | ICD-10-CM

## 2025-05-09 DIAGNOSIS — R91.1 LUNG NODULE: ICD-10-CM

## 2025-05-09 PROCEDURE — 4010F ACE/ARB THERAPY RXD/TAKEN: CPT | Performed by: INTERNAL MEDICINE

## 2025-05-09 PROCEDURE — 96372 THER/PROPH/DIAG INJ SC/IM: CPT | Performed by: INTERNAL MEDICINE

## 2025-05-09 PROCEDURE — 3008F BODY MASS INDEX DOCD: CPT | Performed by: INTERNAL MEDICINE

## 2025-05-09 PROCEDURE — 99214 OFFICE O/P EST MOD 30 MIN: CPT | Performed by: INTERNAL MEDICINE

## 2025-05-09 PROCEDURE — 3078F DIAST BP <80 MM HG: CPT | Performed by: INTERNAL MEDICINE

## 2025-05-09 PROCEDURE — 3074F SYST BP LT 130 MM HG: CPT | Performed by: INTERNAL MEDICINE

## 2025-05-09 RX ORDER — CYANOCOBALAMIN 1000 UG/ML
1000 INJECTION, SOLUTION INTRAMUSCULAR; SUBCUTANEOUS ONCE
Status: COMPLETED | OUTPATIENT
Start: 2025-05-09 | End: 2025-05-09

## 2025-05-09 RX ORDER — DULAGLUTIDE 4.5 MG/.5ML
4.5 INJECTION, SOLUTION SUBCUTANEOUS WEEKLY
Qty: 2 ML | Refills: 0 | Status: SHIPPED | OUTPATIENT
Start: 2025-05-09

## 2025-05-09 RX ADMIN — CYANOCOBALAMIN 1000 MCG: 1000 INJECTION, SOLUTION INTRAMUSCULAR; SUBCUTANEOUS at 16:50

## 2025-05-09 NOTE — PROGRESS NOTES
"Subjective   Patient ID: Antonio Harrison is a 54 y.o. female who presents for Follow-up (Multiple medical issues).    Ms. Antonio Harrison today came here for multiple medical issues.  1. She wants B12 shot, given.  2. She also wants to follow up on diabetes and other conditions.  3. Her last CT scan in December showed possible lymph node.  That needs a follow-up as well.  Appetite and weight are okay.  She came for follow-up.    I have personally reviewed the patient's Past Medical History, Medications, Allergies, Social History, and Family History in the EMR.    Review of Systems   All other systems reviewed and are negative.    Objective   /70   Ht 1.6 m (5' 3\")   Wt 77.6 kg (171 lb)   BMI 30.29 kg/m²     Physical Exam  Vitals reviewed.   Cardiovascular:      Heart sounds: Normal heart sounds, S1 normal and S2 normal. No murmur heard.     No friction rub.   Pulmonary:      Effort: Pulmonary effort is normal.      Breath sounds: Normal breath sounds and air entry.   Abdominal:      Palpations: There is no hepatomegaly, splenomegaly or mass.   Musculoskeletal:      Right lower leg: No edema.      Left lower leg: No edema.   Lymphadenopathy:      Lower Body: No right inguinal adenopathy. No left inguinal adenopathy.   Neurological:      Cranial Nerves: Cranial nerves 2-12 are intact.      Sensory: No sensory deficit.      Motor: Motor function is intact.      Deep Tendon Reflexes: Reflexes are normal and symmetric.     LAB WORK: Laboratory testing discussed.    Assessment/Plan   Problem List Items Addressed This Visit           ICD-10-CM    Fatigue R53.83    Relevant Orders    Magnesium    Hypertension I10    Relevant Orders    CBC and Auto Differential    Diabetes mellitus (Multi) - Primary E11.9    Relevant Medications    dulaglutide (Trulicity) 4.5 mg/0.5 mL pen injector    Other Relevant Orders    Hemoglobin A1C    TSH with reflex to Free T4 if abnormal    Lung nodule R91.1    Relevant Orders    CT abdomen " pelvis w IV contrast     Other Visit Diagnoses         Codes      High cholesterol     E78.00    Relevant Orders    CBC and Auto Differential    Comprehensive Metabolic Panel    Albumin-Creatinine Ratio, Urine Random    Lipid Panel      Vitamin B12 deficiency     E53.8    Relevant Medications    cyanocobalamin (Vitamin B-12) injection 1,000 mcg (Completed)      Abdominal lymphadenopathy     R59.0        1. Abdominal lymphadenopathy.  CT scan ordered.  2. B12 shot given.  3. Type 2 diabetes.  We will check hemoglobin A1c.  4. Trulicity therapy.  She told me that her appetite is coming back.  She gets nauseated.  She is willing to increase the dose.  I made it 4.5 mg.  5. Hypertension, okay.  6. Cholesterol, stable.  7. Follow-up appointment with me in a week after tests.    Scribe Attestation  By signing my name below, IAmie Scribe attest that this documentation has been prepared under the direction and in the presence of Samuel Martinez MD.     All medical record entries made by the leticia were personally dictated by me I have reviewed the chart and agree the record accurately reflects my personal performance of his history physical examination and management

## 2025-05-12 ENCOUNTER — APPOINTMENT (OUTPATIENT)
Dept: PRIMARY CARE | Facility: CLINIC | Age: 55
End: 2025-05-12
Payer: OTHER GOVERNMENT

## 2025-05-19 ENCOUNTER — APPOINTMENT (OUTPATIENT)
Dept: RADIOLOGY | Facility: HOSPITAL | Age: 55
End: 2025-05-19
Payer: OTHER GOVERNMENT

## 2025-05-24 LAB
ALBUMIN SERPL-MCNC: 4.5 G/DL (ref 3.6–5.1)
ALP SERPL-CCNC: 89 U/L (ref 37–153)
ALT SERPL-CCNC: 31 U/L (ref 6–29)
ANION GAP SERPL CALCULATED.4IONS-SCNC: 8 MMOL/L (CALC) (ref 7–17)
AST SERPL-CCNC: 23 U/L (ref 10–35)
BASOPHILS # BLD AUTO: 61 CELLS/UL (ref 0–200)
BASOPHILS NFR BLD AUTO: 0.9 %
BILIRUB SERPL-MCNC: 1.6 MG/DL (ref 0.2–1.2)
BUN SERPL-MCNC: 22 MG/DL (ref 7–25)
CALCIUM SERPL-MCNC: 9.6 MG/DL (ref 8.6–10.4)
CHLORIDE SERPL-SCNC: 105 MMOL/L (ref 98–110)
CHOLEST SERPL-MCNC: 180 MG/DL
CHOLEST/HDLC SERPL: 2.8 (CALC)
CO2 SERPL-SCNC: 26 MMOL/L (ref 20–32)
CREAT SERPL-MCNC: 1.24 MG/DL (ref 0.5–1.03)
EGFRCR SERPLBLD CKD-EPI 2021: 52 ML/MIN/1.73M2
EOSINOPHIL # BLD AUTO: 694 CELLS/UL (ref 15–500)
EOSINOPHIL NFR BLD AUTO: 10.2 %
ERYTHROCYTE [DISTWIDTH] IN BLOOD BY AUTOMATED COUNT: 12.4 % (ref 11–15)
EST. AVERAGE GLUCOSE BLD GHB EST-MCNC: 117 MG/DL
EST. AVERAGE GLUCOSE BLD GHB EST-SCNC: 6.5 MMOL/L
GLUCOSE SERPL-MCNC: 135 MG/DL (ref 65–99)
HBA1C MFR BLD: 5.7 %
HCT VFR BLD AUTO: 45.1 % (ref 35–45)
HDLC SERPL-MCNC: 65 MG/DL
HGB BLD-MCNC: 15.2 G/DL (ref 11.7–15.5)
LDLC SERPL CALC-MCNC: 92 MG/DL (CALC)
LYMPHOCYTES # BLD AUTO: 1822 CELLS/UL (ref 850–3900)
LYMPHOCYTES NFR BLD AUTO: 26.8 %
MAGNESIUM SERPL-MCNC: 1.5 MG/DL (ref 1.5–2.5)
MCH RBC QN AUTO: 29 PG (ref 27–33)
MCHC RBC AUTO-ENTMCNC: 33.7 G/DL (ref 32–36)
MCV RBC AUTO: 85.9 FL (ref 80–100)
MONOCYTES # BLD AUTO: 408 CELLS/UL (ref 200–950)
MONOCYTES NFR BLD AUTO: 6 %
NEUTROPHILS # BLD AUTO: 3815 CELLS/UL (ref 1500–7800)
NEUTROPHILS NFR BLD AUTO: 56.1 %
NONHDLC SERPL-MCNC: 115 MG/DL (CALC)
PLATELET # BLD AUTO: 299 THOUSAND/UL (ref 140–400)
PMV BLD REES-ECKER: 9.5 FL (ref 7.5–12.5)
POTASSIUM SERPL-SCNC: 4.4 MMOL/L (ref 3.5–5.3)
PROT SERPL-MCNC: 7 G/DL (ref 6.1–8.1)
RBC # BLD AUTO: 5.25 MILLION/UL (ref 3.8–5.1)
SODIUM SERPL-SCNC: 139 MMOL/L (ref 135–146)
TRIGL SERPL-MCNC: 134 MG/DL
TSH SERPL-ACNC: 2.3 MIU/L
WBC # BLD AUTO: 6.8 THOUSAND/UL (ref 3.8–10.8)

## 2025-05-27 LAB
ALBUMIN/CREAT UR: 14 MG/G CREAT
CREAT UR-MCNC: 177 MG/DL (ref 20–275)
MICROALBUMIN UR-MCNC: 2.4 MG/DL

## 2025-05-31 ENCOUNTER — APPOINTMENT (OUTPATIENT)
Dept: RADIOLOGY | Facility: HOSPITAL | Age: 55
End: 2025-05-31
Payer: OTHER GOVERNMENT

## 2025-05-31 DIAGNOSIS — R91.1 LUNG NODULE: ICD-10-CM

## 2025-05-31 PROCEDURE — 74177 CT ABD & PELVIS W/CONTRAST: CPT | Performed by: RADIOLOGY

## 2025-05-31 PROCEDURE — 2550000001 HC RX 255 CONTRASTS: Performed by: INTERNAL MEDICINE

## 2025-05-31 PROCEDURE — 74177 CT ABD & PELVIS W/CONTRAST: CPT

## 2025-05-31 RX ADMIN — IOHEXOL 75 ML: 350 INJECTION, SOLUTION INTRAVENOUS at 12:39

## 2025-06-03 ENCOUNTER — APPOINTMENT (OUTPATIENT)
Dept: PRIMARY CARE | Facility: CLINIC | Age: 55
End: 2025-06-03
Payer: OTHER GOVERNMENT

## 2025-06-06 ENCOUNTER — APPOINTMENT (OUTPATIENT)
Dept: PRIMARY CARE | Facility: CLINIC | Age: 55
End: 2025-06-06
Payer: OTHER GOVERNMENT

## 2025-06-10 ENCOUNTER — APPOINTMENT (OUTPATIENT)
Dept: PRIMARY CARE | Facility: CLINIC | Age: 55
End: 2025-06-10
Payer: OTHER GOVERNMENT

## 2025-06-10 VITALS
DIASTOLIC BLOOD PRESSURE: 72 MMHG | HEIGHT: 63 IN | WEIGHT: 170 LBS | BODY MASS INDEX: 30.12 KG/M2 | SYSTOLIC BLOOD PRESSURE: 120 MMHG

## 2025-06-10 DIAGNOSIS — E53.8 VITAMIN B12 DEFICIENCY: ICD-10-CM

## 2025-06-10 DIAGNOSIS — E78.00 HIGH CHOLESTEROL: ICD-10-CM

## 2025-06-10 DIAGNOSIS — Z78.0 POSTMENOPAUSAL: ICD-10-CM

## 2025-06-10 DIAGNOSIS — I10 PRIMARY HYPERTENSION: ICD-10-CM

## 2025-06-10 DIAGNOSIS — R11.0 NAUSEA: ICD-10-CM

## 2025-06-10 DIAGNOSIS — Z13.29 THYROID DISORDER SCREENING: Primary | ICD-10-CM

## 2025-06-10 DIAGNOSIS — R60.9 EDEMA, UNSPECIFIED TYPE: ICD-10-CM

## 2025-06-10 DIAGNOSIS — E11.9 TYPE 2 DIABETES MELLITUS WITHOUT COMPLICATION, WITHOUT LONG-TERM CURRENT USE OF INSULIN: ICD-10-CM

## 2025-06-10 PROCEDURE — 3008F BODY MASS INDEX DOCD: CPT | Performed by: INTERNAL MEDICINE

## 2025-06-10 PROCEDURE — 3074F SYST BP LT 130 MM HG: CPT | Performed by: INTERNAL MEDICINE

## 2025-06-10 PROCEDURE — 3078F DIAST BP <80 MM HG: CPT | Performed by: INTERNAL MEDICINE

## 2025-06-10 PROCEDURE — 99214 OFFICE O/P EST MOD 30 MIN: CPT | Performed by: INTERNAL MEDICINE

## 2025-06-10 PROCEDURE — 4010F ACE/ARB THERAPY RXD/TAKEN: CPT | Performed by: INTERNAL MEDICINE

## 2025-06-10 PROCEDURE — 96372 THER/PROPH/DIAG INJ SC/IM: CPT | Performed by: INTERNAL MEDICINE

## 2025-06-10 RX ORDER — DULAGLUTIDE 4.5 MG/.5ML
4.5 INJECTION, SOLUTION SUBCUTANEOUS WEEKLY
Qty: 2 ML | Refills: 1 | Status: SHIPPED | OUTPATIENT
Start: 2025-06-10

## 2025-06-10 RX ORDER — ONDANSETRON 4 MG/1
4 TABLET, FILM COATED ORAL EVERY 8 HOURS PRN
Qty: 60 TABLET | Refills: 0 | Status: SHIPPED | OUTPATIENT
Start: 2025-06-10 | End: 2025-07-10

## 2025-06-10 RX ORDER — CYANOCOBALAMIN 1000 UG/ML
1000 INJECTION, SOLUTION INTRAMUSCULAR; SUBCUTANEOUS ONCE
Status: COMPLETED | OUTPATIENT
Start: 2025-06-10 | End: 2025-06-10

## 2025-06-10 RX ORDER — METFORMIN HYDROCHLORIDE 1000 MG/1
1000 TABLET ORAL
Qty: 180 TABLET | Refills: 0 | Status: SHIPPED | OUTPATIENT
Start: 2025-06-10

## 2025-06-10 RX ORDER — METOPROLOL SUCCINATE 50 MG/1
50 TABLET, EXTENDED RELEASE ORAL DAILY
Qty: 90 TABLET | Refills: 0 | Status: SHIPPED | OUTPATIENT
Start: 2025-06-10

## 2025-06-10 RX ADMIN — CYANOCOBALAMIN 1000 MCG: 1000 INJECTION, SOLUTION INTRAMUSCULAR; SUBCUTANEOUS at 16:50

## 2025-06-11 NOTE — PROGRESS NOTES
"Subjective   Patient ID: Antonio Harrison is a 54 y.o. female who presents for Follow-up.    Ms. Antonio Harrison today came here for multiple medical issues.  Overall, she is a happy person.  She lost weight.  She is liking Trulicity.  No side effects.  She came for follow-up.  She wants refill on medications.  Appetite and weight are okay.  No problem.  She is seeing specialist.    I have personally reviewed the patient's Past Medical History, Medications, Allergies, Social History, and Family History in the EMR.    Review of Systems   All other systems reviewed and are negative.    Objective   /72   Ht 1.6 m (5' 3\")   Wt 77.1 kg (170 lb)   BMI 30.11 kg/m²     Physical Exam  Vitals reviewed.   Cardiovascular:      Heart sounds: Normal heart sounds, S1 normal and S2 normal. No murmur heard.     No friction rub.   Pulmonary:      Effort: Pulmonary effort is normal.      Breath sounds: Normal breath sounds and air entry.   Abdominal:      Palpations: There is no hepatomegaly, splenomegaly or mass.   Musculoskeletal:      Right lower leg: No edema.      Left lower leg: No edema.   Lymphadenopathy:      Lower Body: No right inguinal adenopathy. No left inguinal adenopathy.   Neurological:      Cranial Nerves: Cranial nerves 2-12 are intact.      Sensory: No sensory deficit.      Motor: Motor function is intact.      Deep Tendon Reflexes: Reflexes are normal and symmetric.     LAB WORK: Laboratory testing discussed.    Assessment/Plan   Problem List Items Addressed This Visit           ICD-10-CM    Edema R60.9    Hypertension I10    Relevant Medications    metoprolol succinate XL (Toprol-XL) 50 mg 24 hr tablet    Other Relevant Orders    Thyroid Stimulating Hormone    Urinalysis with Reflex Culture and Microscopic    Diabetes mellitus (Multi) E11.9    Relevant Medications    dulaglutide (Trulicity) 4.5 mg/0.5 mL pen injector    metFORMIN (Glucophage) 1,000 mg tablet    Other Relevant Orders    Hemoglobin A1C     Other " Visit Diagnoses         Codes      Thyroid disorder screening    -  Primary Z13.29      Vitamin B12 deficiency     E53.8    Relevant Medications    cyanocobalamin (Vitamin B-12) injection 1,000 mcg (Completed)    cyanocobalamin, vitamin B-12, 1,000 mcg/mL kit      Nausea     R11.0    Relevant Medications    ondansetron (Zofran) 4 mg tablet      High cholesterol     E78.00    Relevant Orders    Comprehensive Metabolic Panel    Lipid Panel      Postmenopausal     Z78.0        1. Type 2 diabetes.  Metformin, Trulicity doing good.  Monitor.  Kidney okay.  Regular with hemoglobin A1c.  Regular with the eye checkup.  2. Hypertension, okay.  3. Cholesterol.  Monitor.  4. Thyroid, stable.  5. Postmenopausal.  Take calcium.  6. Edema, okay.  7. Follow-up appointment in three months.  Always happy to serve her anytime if necessary.    Scribe Attestation  By signing my name below, IAmie Scribe attest that this documentation has been prepared under the direction and in the presence of Samuel Martinez MD.     All medical record entries made by the scribe were personally dictated by me I have reviewed the chart and agree the record accurately reflects my personal performance of his history physical examination and management

## 2025-06-25 DIAGNOSIS — Z12.31 ENCOUNTER FOR SCREENING MAMMOGRAM FOR BREAST CANCER: ICD-10-CM

## 2025-07-03 ENCOUNTER — APPOINTMENT (OUTPATIENT)
Dept: RADIOLOGY | Facility: HOSPITAL | Age: 55
End: 2025-07-03
Payer: OTHER GOVERNMENT

## 2025-07-09 ENCOUNTER — OFFICE VISIT (OUTPATIENT)
Dept: PRIMARY CARE | Facility: CLINIC | Age: 55
End: 2025-07-09
Payer: OTHER GOVERNMENT

## 2025-07-09 VITALS
BODY MASS INDEX: 30.44 KG/M2 | DIASTOLIC BLOOD PRESSURE: 76 MMHG | HEIGHT: 63 IN | WEIGHT: 171.8 LBS | SYSTOLIC BLOOD PRESSURE: 124 MMHG

## 2025-07-09 DIAGNOSIS — R35.0 URINARY FREQUENCY: ICD-10-CM

## 2025-07-09 DIAGNOSIS — E53.8 VITAMIN B12 DEFICIENCY: ICD-10-CM

## 2025-07-09 DIAGNOSIS — E11.9 TYPE 2 DIABETES MELLITUS WITHOUT COMPLICATION, WITHOUT LONG-TERM CURRENT USE OF INSULIN: ICD-10-CM

## 2025-07-09 DIAGNOSIS — R39.9 UTI SYMPTOMS: Primary | ICD-10-CM

## 2025-07-09 PROCEDURE — 4010F ACE/ARB THERAPY RXD/TAKEN: CPT | Performed by: INTERNAL MEDICINE

## 2025-07-09 PROCEDURE — 96372 THER/PROPH/DIAG INJ SC/IM: CPT | Performed by: INTERNAL MEDICINE

## 2025-07-09 PROCEDURE — 99213 OFFICE O/P EST LOW 20 MIN: CPT | Performed by: INTERNAL MEDICINE

## 2025-07-09 PROCEDURE — 3078F DIAST BP <80 MM HG: CPT | Performed by: INTERNAL MEDICINE

## 2025-07-09 PROCEDURE — 3008F BODY MASS INDEX DOCD: CPT | Performed by: INTERNAL MEDICINE

## 2025-07-09 PROCEDURE — 3074F SYST BP LT 130 MM HG: CPT | Performed by: INTERNAL MEDICINE

## 2025-07-09 RX ORDER — CYANOCOBALAMIN 1000 UG/ML
1000 INJECTION, SOLUTION INTRAMUSCULAR; SUBCUTANEOUS ONCE
Status: COMPLETED | OUTPATIENT
Start: 2025-07-09 | End: 2025-07-09

## 2025-07-09 RX ORDER — CIPROFLOXACIN 500 MG/1
500 TABLET, FILM COATED ORAL 2 TIMES DAILY
Qty: 20 TABLET | Refills: 0 | Status: SHIPPED | OUTPATIENT
Start: 2025-07-09 | End: 2025-07-19

## 2025-07-09 RX ORDER — DULAGLUTIDE 4.5 MG/.5ML
4.5 INJECTION, SOLUTION SUBCUTANEOUS WEEKLY
Qty: 2 ML | Refills: 2 | Status: SHIPPED | OUTPATIENT
Start: 2025-07-09

## 2025-07-09 RX ADMIN — CYANOCOBALAMIN 1000 MCG: 1000 INJECTION, SOLUTION INTRAMUSCULAR; SUBCUTANEOUS at 15:19

## 2025-07-10 ENCOUNTER — APPOINTMENT (OUTPATIENT)
Dept: RADIOLOGY | Facility: HOSPITAL | Age: 55
End: 2025-07-10
Payer: OTHER GOVERNMENT

## 2025-07-10 NOTE — PROGRESS NOTES
"Subjective   Patient ID: Antonio Harrison is a 54 y.o. female who presents for Follow-up.    Ms. Antonio Harrison today came here for multiple medical issues.  1. She wants B12 shot.  2. Increased frequency, UTI symptoms feeling okay.  3. She is on Trulicity for weight loss and diabetes, doing okay.    I have personally reviewed the patient's Past Medical History, Medications, Allergies, Social History, and Family History in the EMR.    Review of Systems   All other systems reviewed and are negative.    Objective   /76   Ht 1.6 m (5' 3\")   Wt 77.9 kg (171 lb 12.8 oz)   BMI 30.43 kg/m²     Physical Exam  Vitals reviewed.   Cardiovascular:      Heart sounds: Normal heart sounds, S1 normal and S2 normal. No murmur heard.     No friction rub.   Pulmonary:      Effort: Pulmonary effort is normal.      Breath sounds: Normal breath sounds and air entry.   Abdominal:      Palpations: There is no hepatomegaly, splenomegaly or mass.   Musculoskeletal:      Right lower leg: No edema.      Left lower leg: No edema.   Lymphadenopathy:      Lower Body: No right inguinal adenopathy. No left inguinal adenopathy.   Neurological:      Cranial Nerves: Cranial nerves 2-12 are intact.      Sensory: No sensory deficit.      Motor: Motor function is intact.      Deep Tendon Reflexes: Reflexes are normal and symmetric.     LAB WORK: Laboratory testing discussed.    Assessment/Plan   Problem List Items Addressed This Visit           ICD-10-CM    Diabetes mellitus (Multi) E11.9    Relevant Medications    dulaglutide (Trulicity) 4.5 mg/0.5 mL pen injector     Other Visit Diagnoses         Codes      UTI symptoms    -  Primary R39.9      Vitamin B12 deficiency     E53.8    Relevant Medications    cyanocobalamin (Vitamin B-12) injection 1,000 mcg (Completed)      Urinary frequency     R35.0    Relevant Medications    ciprofloxacin (Cipro) 500 mg tablet    Other Relevant Orders    Urinalysis with Reflex Culture and Microscopic        1. Increased " frequency, UTI symptoms.  Cipro given.  We will check urine.  2. B12 shot given.  3. Type 2 diabetes and weight.  Trulicity is doing good job.  Continue.  3. Follow-up in routine check.  Always happy to serve her anytime if necessary.    Scribe Attestation  By signing my name below, IAmie Scribe attest that this documentation has been prepared under the direction and in the presence of Samuel Martinez MD.     All medical record entries made by the leticia were personally dictated by me I have reviewed the chart and agree the record accurately reflects my personal performance of his history physical examination and management

## 2025-07-11 PROCEDURE — 99285 EMERGENCY DEPT VISIT HI MDM: CPT | Performed by: STUDENT IN AN ORGANIZED HEALTH CARE EDUCATION/TRAINING PROGRAM

## 2025-07-11 ASSESSMENT — LIFESTYLE VARIABLES
EVER HAD A DRINK FIRST THING IN THE MORNING TO STEADY YOUR NERVES TO GET RID OF A HANGOVER: NO
HAVE YOU EVER FELT YOU SHOULD CUT DOWN ON YOUR DRINKING: NO
TOTAL SCORE: 0
EVER FELT BAD OR GUILTY ABOUT YOUR DRINKING: NO
HAVE PEOPLE ANNOYED YOU BY CRITICIZING YOUR DRINKING: NO

## 2025-07-12 ENCOUNTER — APPOINTMENT (OUTPATIENT)
Dept: RADIOLOGY | Facility: HOSPITAL | Age: 55
DRG: 690 | End: 2025-07-12
Payer: OTHER GOVERNMENT

## 2025-07-12 ENCOUNTER — APPOINTMENT (OUTPATIENT)
Dept: CARDIOLOGY | Facility: HOSPITAL | Age: 55
DRG: 690 | End: 2025-07-12
Payer: OTHER GOVERNMENT

## 2025-07-12 ENCOUNTER — HOSPITAL ENCOUNTER (INPATIENT)
Facility: HOSPITAL | Age: 55
LOS: 1 days | Discharge: HOME | DRG: 690 | End: 2025-07-13
Attending: STUDENT IN AN ORGANIZED HEALTH CARE EDUCATION/TRAINING PROGRAM | Admitting: INTERNAL MEDICINE
Payer: OTHER GOVERNMENT

## 2025-07-12 DIAGNOSIS — N20.0 RIGHT NEPHROLITHIASIS: ICD-10-CM

## 2025-07-12 DIAGNOSIS — N13.2 HYDRONEPHROSIS WITH URINARY OBSTRUCTION DUE TO URETERAL CALCULUS: ICD-10-CM

## 2025-07-12 DIAGNOSIS — R11.0 NAUSEA: ICD-10-CM

## 2025-07-12 DIAGNOSIS — N20.0 KIDNEY STONE: Primary | ICD-10-CM

## 2025-07-12 DIAGNOSIS — N13.2 HYDRONEPHROSIS WITH RENAL AND URETERAL CALCULOUS OBSTRUCTION: Primary | ICD-10-CM

## 2025-07-12 DIAGNOSIS — N13.30 HYDRONEPHROSIS OF RIGHT KIDNEY: ICD-10-CM

## 2025-07-12 DIAGNOSIS — N30.01 ACUTE CYSTITIS WITH HEMATURIA: ICD-10-CM

## 2025-07-12 PROBLEM — N17.9 AKI (ACUTE KIDNEY INJURY): Status: ACTIVE | Noted: 2025-07-12

## 2025-07-12 LAB
ABO GROUP (TYPE) IN BLOOD: NORMAL
ALBUMIN SERPL BCP-MCNC: 3.2 G/DL (ref 3.4–5)
ALBUMIN SERPL BCP-MCNC: 4.2 G/DL (ref 3.4–5)
ALP SERPL-CCNC: 81 U/L (ref 33–110)
ALT SERPL W P-5'-P-CCNC: 21 U/L (ref 7–45)
ANION GAP SERPL CALC-SCNC: 11 MMOL/L (ref 10–20)
ANION GAP SERPL CALC-SCNC: 19 MMOL/L (ref 10–20)
ANTIBODY SCREEN: NORMAL
APPEARANCE UR: ABNORMAL
AST SERPL W P-5'-P-CCNC: 18 U/L (ref 9–39)
BACTERIA #/AREA URNS AUTO: ABNORMAL /HPF
BASOPHILS # BLD AUTO: 0.04 X10*3/UL (ref 0–0.1)
BASOPHILS NFR BLD AUTO: 0.4 %
BILIRUB SERPL-MCNC: 1.5 MG/DL (ref 0–1.2)
BILIRUB UR STRIP.AUTO-MCNC: ABNORMAL MG/DL
BUN SERPL-MCNC: 17 MG/DL (ref 6–23)
BUN SERPL-MCNC: 19 MG/DL (ref 6–23)
CALCIUM SERPL-MCNC: 8.1 MG/DL (ref 8.6–10.3)
CALCIUM SERPL-MCNC: 9.5 MG/DL (ref 8.6–10.3)
CHLORIDE SERPL-SCNC: 102 MMOL/L (ref 98–107)
CHLORIDE SERPL-SCNC: 108 MMOL/L (ref 98–107)
CO2 SERPL-SCNC: 19 MMOL/L (ref 21–32)
CO2 SERPL-SCNC: 23 MMOL/L (ref 21–32)
COLOR UR: ABNORMAL
CREAT SERPL-MCNC: 1.84 MG/DL (ref 0.5–1.05)
CREAT SERPL-MCNC: 2.01 MG/DL (ref 0.5–1.05)
CREAT UR-MCNC: 265.7 MG/DL (ref 20–320)
CREAT UR-MCNC: 265.7 MG/DL (ref 20–320)
EGFRCR SERPLBLD CKD-EPI 2021: 29 ML/MIN/1.73M*2
EGFRCR SERPLBLD CKD-EPI 2021: 32 ML/MIN/1.73M*2
EOSINOPHIL # BLD AUTO: 0.38 X10*3/UL (ref 0–0.7)
EOSINOPHIL NFR BLD AUTO: 3.7 %
ERYTHROCYTE [DISTWIDTH] IN BLOOD BY AUTOMATED COUNT: 13.2 % (ref 11.5–14.5)
GLUCOSE BLD MANUAL STRIP-MCNC: 184 MG/DL (ref 74–99)
GLUCOSE BLD MANUAL STRIP-MCNC: 82 MG/DL (ref 74–99)
GLUCOSE BLD MANUAL STRIP-MCNC: 96 MG/DL (ref 74–99)
GLUCOSE BLD MANUAL STRIP-MCNC: 99 MG/DL (ref 74–99)
GLUCOSE SERPL-MCNC: 111 MG/DL (ref 74–99)
GLUCOSE SERPL-MCNC: 145 MG/DL (ref 74–99)
GLUCOSE UR STRIP.AUTO-MCNC: NORMAL MG/DL
HCG UR QL IA.RAPID: NEGATIVE
HCT VFR BLD AUTO: 39.8 % (ref 36–46)
HGB BLD-MCNC: 14.2 G/DL (ref 12–16)
HOLD SPECIMEN: NORMAL
HOLD SPECIMEN: NORMAL
HYALINE CASTS #/AREA URNS AUTO: ABNORMAL /LPF
IMM GRANULOCYTES # BLD AUTO: 0.02 X10*3/UL (ref 0–0.7)
IMM GRANULOCYTES NFR BLD AUTO: 0.2 % (ref 0–0.9)
INR PPP: 1.1 (ref 0.9–1.1)
KETONES UR STRIP.AUTO-MCNC: ABNORMAL MG/DL
LEUKOCYTE ESTERASE UR QL STRIP.AUTO: NEGATIVE
LIPASE SERPL-CCNC: 34 U/L (ref 9–82)
LYMPHOCYTES # BLD AUTO: 1.29 X10*3/UL (ref 1.2–4.8)
LYMPHOCYTES NFR BLD AUTO: 12.5 %
MCH RBC QN AUTO: 29.4 PG (ref 26–34)
MCHC RBC AUTO-ENTMCNC: 35.7 G/DL (ref 32–36)
MCV RBC AUTO: 82 FL (ref 80–100)
MONOCYTES # BLD AUTO: 0.52 X10*3/UL (ref 0.1–1)
MONOCYTES NFR BLD AUTO: 5 %
MUCOUS THREADS #/AREA URNS AUTO: ABNORMAL /LPF
NEUTROPHILS # BLD AUTO: 8.07 X10*3/UL (ref 1.2–7.7)
NEUTROPHILS NFR BLD AUTO: 78.2 %
NITRITE UR QL STRIP.AUTO: ABNORMAL
NRBC BLD-RTO: 0 /100 WBCS (ref 0–0)
OSMOLALITY SERPL: 294 MOSM/KG (ref 280–300)
OSMOLALITY UR: 302 MOSM/KG (ref 200–1200)
PH UR STRIP.AUTO: 6 [PH]
PHOSPHATE SERPL-MCNC: 4 MG/DL (ref 2.5–4.9)
PLATELET # BLD AUTO: 293 X10*3/UL (ref 150–450)
POTASSIUM SERPL-SCNC: 3.9 MMOL/L (ref 3.5–5.3)
POTASSIUM SERPL-SCNC: 4.2 MMOL/L (ref 3.5–5.3)
PROT SERPL-MCNC: 7.4 G/DL (ref 6.4–8.2)
PROT UR STRIP.AUTO-MCNC: ABNORMAL MG/DL
PROT UR-ACNC: 95 MG/DL (ref 5–24)
PROT/CREAT UR: 0.36 MG/MG CREAT (ref 0–0.17)
PROTHROMBIN TIME: 12.5 SECONDS (ref 9.8–12.4)
RBC # BLD AUTO: 4.83 X10*6/UL (ref 4–5.2)
RBC # UR STRIP.AUTO: ABNORMAL MG/DL
RBC #/AREA URNS AUTO: >20 /HPF
RH FACTOR (ANTIGEN D): NORMAL
SODIUM SERPL-SCNC: 136 MMOL/L (ref 136–145)
SODIUM SERPL-SCNC: 138 MMOL/L (ref 136–145)
SP GR UR STRIP.AUTO: 1.03
SQUAMOUS #/AREA URNS AUTO: ABNORMAL /HPF
URATE SERPL-MCNC: 5.3 MG/DL (ref 2.3–6.7)
UROBILINOGEN UR STRIP.AUTO-MCNC: ABNORMAL MG/DL
WBC # BLD AUTO: 10.3 X10*3/UL (ref 4.4–11.3)
WBC #/AREA URNS AUTO: ABNORMAL /HPF

## 2025-07-12 PROCEDURE — 85610 PROTHROMBIN TIME: CPT | Performed by: NURSE PRACTITIONER

## 2025-07-12 PROCEDURE — 82365 CALCULUS SPECTROSCOPY: CPT | Performed by: INTERNAL MEDICINE

## 2025-07-12 PROCEDURE — 83935 ASSAY OF URINE OSMOLALITY: CPT | Mod: GEALAB | Performed by: INTERNAL MEDICINE

## 2025-07-12 PROCEDURE — 93005 ELECTROCARDIOGRAM TRACING: CPT

## 2025-07-12 PROCEDURE — 2500000004 HC RX 250 GENERAL PHARMACY W/ HCPCS (ALT 636 FOR OP/ED): Performed by: STUDENT IN AN ORGANIZED HEALTH CARE EDUCATION/TRAINING PROGRAM

## 2025-07-12 PROCEDURE — 2500000004 HC RX 250 GENERAL PHARMACY W/ HCPCS (ALT 636 FOR OP/ED): Performed by: NURSE PRACTITIONER

## 2025-07-12 PROCEDURE — 99223 1ST HOSP IP/OBS HIGH 75: CPT | Performed by: NURSE PRACTITIONER

## 2025-07-12 PROCEDURE — 2500000001 HC RX 250 WO HCPCS SELF ADMINISTERED DRUGS (ALT 637 FOR MEDICARE OP): Performed by: INTERNAL MEDICINE

## 2025-07-12 PROCEDURE — 81001 URINALYSIS AUTO W/SCOPE: CPT | Performed by: STUDENT IN AN ORGANIZED HEALTH CARE EDUCATION/TRAINING PROGRAM

## 2025-07-12 PROCEDURE — 36415 COLL VENOUS BLD VENIPUNCTURE: CPT | Performed by: STUDENT IN AN ORGANIZED HEALTH CARE EDUCATION/TRAINING PROGRAM

## 2025-07-12 PROCEDURE — 82947 ASSAY GLUCOSE BLOOD QUANT: CPT

## 2025-07-12 PROCEDURE — 80069 RENAL FUNCTION PANEL: CPT | Mod: CCI | Performed by: INTERNAL MEDICINE

## 2025-07-12 PROCEDURE — 96375 TX/PRO/DX INJ NEW DRUG ADDON: CPT

## 2025-07-12 PROCEDURE — 2500000001 HC RX 250 WO HCPCS SELF ADMINISTERED DRUGS (ALT 637 FOR MEDICARE OP): Performed by: NURSE PRACTITIONER

## 2025-07-12 PROCEDURE — 84156 ASSAY OF PROTEIN URINE: CPT | Performed by: NURSE PRACTITIONER

## 2025-07-12 PROCEDURE — 36415 COLL VENOUS BLD VENIPUNCTURE: CPT | Performed by: NURSE PRACTITIONER

## 2025-07-12 PROCEDURE — 85025 COMPLETE CBC W/AUTO DIFF WBC: CPT | Performed by: STUDENT IN AN ORGANIZED HEALTH CARE EDUCATION/TRAINING PROGRAM

## 2025-07-12 PROCEDURE — 99222 1ST HOSP IP/OBS MODERATE 55: CPT | Performed by: UROLOGY

## 2025-07-12 PROCEDURE — 83690 ASSAY OF LIPASE: CPT | Performed by: STUDENT IN AN ORGANIZED HEALTH CARE EDUCATION/TRAINING PROGRAM

## 2025-07-12 PROCEDURE — 80053 COMPREHEN METABOLIC PANEL: CPT | Performed by: STUDENT IN AN ORGANIZED HEALTH CARE EDUCATION/TRAINING PROGRAM

## 2025-07-12 PROCEDURE — 2020000001 HC ICU ROOM DAILY

## 2025-07-12 PROCEDURE — 94760 N-INVAS EAR/PLS OXIMETRY 1: CPT

## 2025-07-12 PROCEDURE — 2500000004 HC RX 250 GENERAL PHARMACY W/ HCPCS (ALT 636 FOR OP/ED): Performed by: INTERNAL MEDICINE

## 2025-07-12 PROCEDURE — 96374 THER/PROPH/DIAG INJ IV PUSH: CPT

## 2025-07-12 PROCEDURE — 86900 BLOOD TYPING SEROLOGIC ABO: CPT | Performed by: NURSE PRACTITIONER

## 2025-07-12 PROCEDURE — 83930 ASSAY OF BLOOD OSMOLALITY: CPT | Mod: GEALAB | Performed by: INTERNAL MEDICINE

## 2025-07-12 PROCEDURE — 82570 ASSAY OF URINE CREATININE: CPT | Performed by: NURSE PRACTITIONER

## 2025-07-12 PROCEDURE — 81025 URINE PREGNANCY TEST: CPT | Performed by: NURSE PRACTITIONER

## 2025-07-12 PROCEDURE — 87086 URINE CULTURE/COLONY COUNT: CPT | Mod: GEALAB | Performed by: STUDENT IN AN ORGANIZED HEALTH CARE EDUCATION/TRAINING PROGRAM

## 2025-07-12 PROCEDURE — 74176 CT ABD & PELVIS W/O CONTRAST: CPT

## 2025-07-12 PROCEDURE — 74176 CT ABD & PELVIS W/O CONTRAST: CPT | Performed by: RADIOLOGY

## 2025-07-12 PROCEDURE — 84550 ASSAY OF BLOOD/URIC ACID: CPT | Mod: GEALAB | Performed by: NURSE PRACTITIONER

## 2025-07-12 PROCEDURE — 2500000002 HC RX 250 W HCPCS SELF ADMINISTERED DRUGS (ALT 637 FOR MEDICARE OP, ALT 636 FOR OP/ED): Performed by: NURSE PRACTITIONER

## 2025-07-12 RX ORDER — PANTOPRAZOLE SODIUM 20 MG/1
20 TABLET, DELAYED RELEASE ORAL
Status: DISCONTINUED | OUTPATIENT
Start: 2025-07-12 | End: 2025-07-13 | Stop reason: HOSPADM

## 2025-07-12 RX ORDER — METOPROLOL SUCCINATE 50 MG/1
50 TABLET, EXTENDED RELEASE ORAL DAILY
Status: DISCONTINUED | OUTPATIENT
Start: 2025-07-12 | End: 2025-07-13 | Stop reason: HOSPADM

## 2025-07-12 RX ORDER — OXYCODONE HYDROCHLORIDE 5 MG/1
5 TABLET ORAL EVERY 6 HOURS PRN
Refills: 0 | Status: DISCONTINUED | OUTPATIENT
Start: 2025-07-12 | End: 2025-07-13 | Stop reason: HOSPADM

## 2025-07-12 RX ORDER — CIPROFLOXACIN 2 MG/ML
400 INJECTION, SOLUTION INTRAVENOUS EVERY 12 HOURS
Status: DISCONTINUED | OUTPATIENT
Start: 2025-07-12 | End: 2025-07-13 | Stop reason: HOSPADM

## 2025-07-12 RX ORDER — KETOROLAC TROMETHAMINE 15 MG/ML
15 INJECTION, SOLUTION INTRAMUSCULAR; INTRAVENOUS EVERY 6 HOURS SCHEDULED
Status: DISCONTINUED | OUTPATIENT
Start: 2025-07-12 | End: 2025-07-12

## 2025-07-12 RX ORDER — ONDANSETRON HYDROCHLORIDE 2 MG/ML
4 INJECTION, SOLUTION INTRAVENOUS EVERY 6 HOURS PRN
Status: DISCONTINUED | OUTPATIENT
Start: 2025-07-12 | End: 2025-07-13 | Stop reason: HOSPADM

## 2025-07-12 RX ORDER — DEXTROSE 50 % IN WATER (D50W) INTRAVENOUS SYRINGE
12.5
Status: DISCONTINUED | OUTPATIENT
Start: 2025-07-12 | End: 2025-07-13 | Stop reason: HOSPADM

## 2025-07-12 RX ORDER — MORPHINE SULFATE 4 MG/ML
4 INJECTION INTRAVENOUS ONCE
Status: COMPLETED | OUTPATIENT
Start: 2025-07-12 | End: 2025-07-12

## 2025-07-12 RX ORDER — LOSARTAN POTASSIUM 50 MG/1
100 TABLET ORAL DAILY
Status: DISCONTINUED | OUTPATIENT
Start: 2025-07-12 | End: 2025-07-13 | Stop reason: HOSPADM

## 2025-07-12 RX ORDER — ACETAMINOPHEN 325 MG/1
975 TABLET ORAL EVERY 8 HOURS
Status: DISCONTINUED | OUTPATIENT
Start: 2025-07-12 | End: 2025-07-12

## 2025-07-12 RX ORDER — ONDANSETRON 4 MG/1
4 TABLET, FILM COATED ORAL EVERY 8 HOURS PRN
Status: DISCONTINUED | OUTPATIENT
Start: 2025-07-12 | End: 2025-07-12

## 2025-07-12 RX ORDER — LIDOCAINE 560 MG/1
1 PATCH PERCUTANEOUS; TOPICAL; TRANSDERMAL DAILY
Status: DISCONTINUED | OUTPATIENT
Start: 2025-07-12 | End: 2025-07-13 | Stop reason: HOSPADM

## 2025-07-12 RX ORDER — NALOXONE HYDROCHLORIDE 0.4 MG/ML
0.4 INJECTION, SOLUTION INTRAMUSCULAR; INTRAVENOUS; SUBCUTANEOUS AS NEEDED
Status: DISCONTINUED | OUTPATIENT
Start: 2025-07-12 | End: 2025-07-13 | Stop reason: HOSPADM

## 2025-07-12 RX ORDER — ACETAMINOPHEN 325 MG/1
975 TABLET ORAL EVERY 6 HOURS
Status: DISCONTINUED | OUTPATIENT
Start: 2025-07-12 | End: 2025-07-13 | Stop reason: HOSPADM

## 2025-07-12 RX ORDER — CIPROFLOXACIN 2 MG/ML
400 INJECTION, SOLUTION INTRAVENOUS EVERY 12 HOURS
Status: DISCONTINUED | OUTPATIENT
Start: 2025-07-12 | End: 2025-07-12

## 2025-07-12 RX ORDER — ONDANSETRON HYDROCHLORIDE 2 MG/ML
4 INJECTION, SOLUTION INTRAVENOUS EVERY 8 HOURS PRN
Status: DISCONTINUED | OUTPATIENT
Start: 2025-07-12 | End: 2025-07-12

## 2025-07-12 RX ORDER — HYDROGEN PEROXIDE 3 %
20 SOLUTION, NON-ORAL MISCELLANEOUS
COMMUNITY

## 2025-07-12 RX ORDER — DEXTROAMPHETAMINE SACCHARATE, AMPHETAMINE ASPARTATE, DEXTROAMPHETAMINE SULFATE AND AMPHETAMINE SULFATE 2.5; 2.5; 2.5; 2.5 MG/1; MG/1; MG/1; MG/1
30 TABLET ORAL DAILY
Status: DISCONTINUED | OUTPATIENT
Start: 2025-07-12 | End: 2025-07-13 | Stop reason: HOSPADM

## 2025-07-12 RX ORDER — SODIUM CHLORIDE, SODIUM LACTATE, POTASSIUM CHLORIDE, CALCIUM CHLORIDE 600; 310; 30; 20 MG/100ML; MG/100ML; MG/100ML; MG/100ML
100 INJECTION, SOLUTION INTRAVENOUS CONTINUOUS
Status: DISCONTINUED | OUTPATIENT
Start: 2025-07-12 | End: 2025-07-13

## 2025-07-12 RX ORDER — INSULIN LISPRO 100 [IU]/ML
0-10 INJECTION, SOLUTION INTRAVENOUS; SUBCUTANEOUS
Status: DISCONTINUED | OUTPATIENT
Start: 2025-07-12 | End: 2025-07-13 | Stop reason: HOSPADM

## 2025-07-12 RX ORDER — TAMSULOSIN HYDROCHLORIDE 0.4 MG/1
0.4 CAPSULE ORAL DAILY
Status: DISCONTINUED | OUTPATIENT
Start: 2025-07-12 | End: 2025-07-13 | Stop reason: HOSPADM

## 2025-07-12 RX ORDER — DOCUSATE SODIUM 100 MG/1
100 CAPSULE, LIQUID FILLED ORAL 2 TIMES DAILY
Status: DISCONTINUED | OUTPATIENT
Start: 2025-07-12 | End: 2025-07-13 | Stop reason: HOSPADM

## 2025-07-12 RX ORDER — DEXTROSE 50 % IN WATER (D50W) INTRAVENOUS SYRINGE
25
Status: DISCONTINUED | OUTPATIENT
Start: 2025-07-12 | End: 2025-07-13 | Stop reason: HOSPADM

## 2025-07-12 RX ORDER — CIPROFLOXACIN 2 MG/ML
400 INJECTION, SOLUTION INTRAVENOUS ONCE
Status: COMPLETED | OUTPATIENT
Start: 2025-07-12 | End: 2025-07-12

## 2025-07-12 RX ORDER — SODIUM CHLORIDE 9 MG/ML
125 INJECTION, SOLUTION INTRAVENOUS CONTINUOUS
Status: DISCONTINUED | OUTPATIENT
Start: 2025-07-12 | End: 2025-07-12

## 2025-07-12 RX ORDER — INSULIN LISPRO 100 [IU]/ML
0-10 INJECTION, SOLUTION INTRAVENOUS; SUBCUTANEOUS EVERY 6 HOURS
Status: DISCONTINUED | OUTPATIENT
Start: 2025-07-12 | End: 2025-07-12

## 2025-07-12 RX ADMIN — ONDANSETRON 4 MG: 2 INJECTION INTRAMUSCULAR; INTRAVENOUS at 05:49

## 2025-07-12 RX ADMIN — OXYCODONE 5 MG: 5 TABLET ORAL at 17:13

## 2025-07-12 RX ADMIN — HYDROMORPHONE HYDROCHLORIDE 0.5 MG: 1 INJECTION, SOLUTION INTRAMUSCULAR; INTRAVENOUS; SUBCUTANEOUS at 08:26

## 2025-07-12 RX ADMIN — SODIUM CHLORIDE 125 ML/HR: 0.9 INJECTION, SOLUTION INTRAVENOUS at 05:49

## 2025-07-12 RX ADMIN — TAMSULOSIN HYDROCHLORIDE 0.4 MG: 0.4 CAPSULE ORAL at 04:05

## 2025-07-12 RX ADMIN — ACETAMINOPHEN 975 MG: 325 TABLET ORAL at 04:05

## 2025-07-12 RX ADMIN — PIPERACILLIN SODIUM AND TAZOBACTAM SODIUM 3.38 G: 3; .375 INJECTION, SOLUTION INTRAVENOUS at 12:59

## 2025-07-12 RX ADMIN — HYDROMORPHONE HYDROCHLORIDE 0.5 MG: 1 INJECTION, SOLUTION INTRAMUSCULAR; INTRAVENOUS; SUBCUTANEOUS at 20:31

## 2025-07-12 RX ADMIN — SODIUM CHLORIDE 500 ML: 0.9 INJECTION, SOLUTION INTRAVENOUS at 04:08

## 2025-07-12 RX ADMIN — HYDROMORPHONE HYDROCHLORIDE 0.5 MG: 1 INJECTION, SOLUTION INTRAMUSCULAR; INTRAVENOUS; SUBCUTANEOUS at 01:52

## 2025-07-12 RX ADMIN — CIPROFLOXACIN 400 MG: 400 INJECTION, SOLUTION INTRAVENOUS at 17:03

## 2025-07-12 RX ADMIN — ONDANSETRON 4 MG: 2 INJECTION, SOLUTION INTRAMUSCULAR; INTRAVENOUS at 09:16

## 2025-07-12 RX ADMIN — PIPERACILLIN SODIUM AND TAZOBACTAM SODIUM 3.38 G: 3; .375 INJECTION, SOLUTION INTRAVENOUS at 08:15

## 2025-07-12 RX ADMIN — OXYCODONE 5 MG: 5 TABLET ORAL at 04:38

## 2025-07-12 RX ADMIN — SODIUM CHLORIDE, SODIUM LACTATE, POTASSIUM CHLORIDE, AND CALCIUM CHLORIDE 100 ML/HR: .6; .31; .03; .02 INJECTION, SOLUTION INTRAVENOUS at 17:58

## 2025-07-12 RX ADMIN — CIPROFLOXACIN 400 MG: 400 INJECTION, SOLUTION INTRAVENOUS at 02:44

## 2025-07-12 RX ADMIN — ACETAMINOPHEN 975 MG: 325 TABLET, FILM COATED ORAL at 20:31

## 2025-07-12 RX ADMIN — DOCUSATE SODIUM 100 MG: 100 CAPSULE, LIQUID FILLED ORAL at 20:31

## 2025-07-12 RX ADMIN — MORPHINE SULFATE 4 MG: 4 INJECTION INTRAVENOUS at 00:58

## 2025-07-12 SDOH — SOCIAL STABILITY: SOCIAL INSECURITY: HAVE YOU HAD ANY THOUGHTS OF HARMING ANYONE ELSE?: NO

## 2025-07-12 SDOH — ECONOMIC STABILITY: FOOD INSECURITY: WITHIN THE PAST 12 MONTHS, YOU WORRIED THAT YOUR FOOD WOULD RUN OUT BEFORE YOU GOT THE MONEY TO BUY MORE.: NEVER TRUE

## 2025-07-12 SDOH — HEALTH STABILITY: MENTAL HEALTH: HOW MANY DRINKS CONTAINING ALCOHOL DO YOU HAVE ON A TYPICAL DAY WHEN YOU ARE DRINKING?: PATIENT DOES NOT DRINK

## 2025-07-12 SDOH — ECONOMIC STABILITY: FOOD INSECURITY: WITHIN THE PAST 12 MONTHS, THE FOOD YOU BOUGHT JUST DIDN'T LAST AND YOU DIDN'T HAVE MONEY TO GET MORE.: NEVER TRUE

## 2025-07-12 SDOH — ECONOMIC STABILITY: HOUSING INSECURITY: AT ANY TIME IN THE PAST 12 MONTHS, WERE YOU HOMELESS OR LIVING IN A SHELTER (INCLUDING NOW)?: NO

## 2025-07-12 SDOH — ECONOMIC STABILITY: INCOME INSECURITY: IN THE PAST 12 MONTHS HAS THE ELECTRIC, GAS, OIL, OR WATER COMPANY THREATENED TO SHUT OFF SERVICES IN YOUR HOME?: NO

## 2025-07-12 SDOH — SOCIAL STABILITY: SOCIAL INSECURITY
WITHIN THE LAST YEAR, HAVE YOU BEEN RAPED OR FORCED TO HAVE ANY KIND OF SEXUAL ACTIVITY BY YOUR PARTNER OR EX-PARTNER?: NO

## 2025-07-12 SDOH — ECONOMIC STABILITY: TRANSPORTATION INSECURITY: IN THE PAST 12 MONTHS, HAS LACK OF TRANSPORTATION KEPT YOU FROM MEDICAL APPOINTMENTS OR FROM GETTING MEDICATIONS?: NO

## 2025-07-12 SDOH — HEALTH STABILITY: MENTAL HEALTH: HOW OFTEN DO YOU HAVE SIX OR MORE DRINKS ON ONE OCCASION?: NEVER

## 2025-07-12 SDOH — SOCIAL STABILITY: SOCIAL INSECURITY: WITHIN THE LAST YEAR, HAVE YOU BEEN HUMILIATED OR EMOTIONALLY ABUSED IN OTHER WAYS BY YOUR PARTNER OR EX-PARTNER?: NO

## 2025-07-12 SDOH — SOCIAL STABILITY: SOCIAL INSECURITY
WITHIN THE LAST YEAR, HAVE YOU BEEN KICKED, HIT, SLAPPED, OR OTHERWISE PHYSICALLY HURT BY YOUR PARTNER OR EX-PARTNER?: NO

## 2025-07-12 SDOH — SOCIAL STABILITY: SOCIAL INSECURITY: DOES ANYONE TRY TO KEEP YOU FROM HAVING/CONTACTING OTHER FRIENDS OR DOING THINGS OUTSIDE YOUR HOME?: NO

## 2025-07-12 SDOH — SOCIAL STABILITY: SOCIAL INSECURITY: ARE THERE ANY APPARENT SIGNS OF INJURIES/BEHAVIORS THAT COULD BE RELATED TO ABUSE/NEGLECT?: NO

## 2025-07-12 SDOH — ECONOMIC STABILITY: FOOD INSECURITY: HOW HARD IS IT FOR YOU TO PAY FOR THE VERY BASICS LIKE FOOD, HOUSING, MEDICAL CARE, AND HEATING?: SOMEWHAT HARD

## 2025-07-12 SDOH — ECONOMIC STABILITY: HOUSING INSECURITY: IN THE LAST 12 MONTHS, WAS THERE A TIME WHEN YOU WERE NOT ABLE TO PAY THE MORTGAGE OR RENT ON TIME?: NO

## 2025-07-12 SDOH — SOCIAL STABILITY: SOCIAL INSECURITY: HAVE YOU HAD THOUGHTS OF HARMING ANYONE ELSE?: NO

## 2025-07-12 SDOH — SOCIAL STABILITY: SOCIAL INSECURITY: ABUSE: ADULT

## 2025-07-12 SDOH — ECONOMIC STABILITY: HOUSING INSECURITY: IN THE PAST 12 MONTHS, HOW MANY TIMES HAVE YOU MOVED WHERE YOU WERE LIVING?: 0

## 2025-07-12 SDOH — SOCIAL STABILITY: SOCIAL INSECURITY: WITHIN THE LAST YEAR, HAVE YOU BEEN AFRAID OF YOUR PARTNER OR EX-PARTNER?: NO

## 2025-07-12 SDOH — HEALTH STABILITY: MENTAL HEALTH: HOW OFTEN DO YOU HAVE A DRINK CONTAINING ALCOHOL?: NEVER

## 2025-07-12 SDOH — SOCIAL STABILITY: SOCIAL INSECURITY: DO YOU FEEL UNSAFE GOING BACK TO THE PLACE WHERE YOU ARE LIVING?: NO

## 2025-07-12 SDOH — SOCIAL STABILITY: SOCIAL INSECURITY: DO YOU FEEL ANYONE HAS EXPLOITED OR TAKEN ADVANTAGE OF YOU FINANCIALLY OR OF YOUR PERSONAL PROPERTY?: NO

## 2025-07-12 SDOH — SOCIAL STABILITY: SOCIAL INSECURITY: HAS ANYONE EVER THREATENED TO HURT YOUR FAMILY OR YOUR PETS?: NO

## 2025-07-12 SDOH — SOCIAL STABILITY: SOCIAL INSECURITY: ARE YOU OR HAVE YOU BEEN THREATENED OR ABUSED PHYSICALLY, EMOTIONALLY, OR SEXUALLY BY ANYONE?: NO

## 2025-07-12 SDOH — SOCIAL STABILITY: SOCIAL INSECURITY: WERE YOU ABLE TO COMPLETE ALL THE BEHAVIORAL HEALTH SCREENINGS?: YES

## 2025-07-12 ASSESSMENT — ENCOUNTER SYMPTOMS
BACK PAIN: 1
CONSTIPATION: 0
HEADACHES: 0
DIZZINESS: 0
DIARRHEA: 0
SORE THROAT: 0
FREQUENCY: 1
HEMATURIA: 1
VOMITING: 1
FLANK PAIN: 1
CHILLS: 1
DYSURIA: 0
COUGH: 0
NAUSEA: 1
SHORTNESS OF BREATH: 0
CONFUSION: 0
FEVER: 1
COLOR CHANGE: 0
AGITATION: 0

## 2025-07-12 ASSESSMENT — COGNITIVE AND FUNCTIONAL STATUS - GENERAL
DAILY ACTIVITIY SCORE: 24
DAILY ACTIVITIY SCORE: 24
MOBILITY SCORE: 24
DAILY ACTIVITIY SCORE: 24
PATIENT BASELINE BEDBOUND: NO
DAILY ACTIVITIY SCORE: 24
MOBILITY SCORE: 24

## 2025-07-12 ASSESSMENT — PAIN SCALES - GENERAL
PAINLEVEL_OUTOF10: 7
PAINLEVEL_OUTOF10: 7
PAINLEVEL_OUTOF10: 6
PAINLEVEL_OUTOF10: 8
PAINLEVEL_OUTOF10: 2
PAINLEVEL_OUTOF10: 7
PAINLEVEL_OUTOF10: 4

## 2025-07-12 ASSESSMENT — ACTIVITIES OF DAILY LIVING (ADL)
BATHING: INDEPENDENT
JUDGMENT_ADEQUATE_SAFELY_COMPLETE_DAILY_ACTIVITIES: YES
WALKS IN HOME: INDEPENDENT
LACK_OF_TRANSPORTATION: NO
ADEQUATE_TO_COMPLETE_ADL: YES
FEEDING YOURSELF: INDEPENDENT
LACK_OF_TRANSPORTATION: NO
DRESSING YOURSELF: INDEPENDENT
HEARING - LEFT EAR: FUNCTIONAL
TOILETING: INDEPENDENT
PATIENT'S MEMORY ADEQUATE TO SAFELY COMPLETE DAILY ACTIVITIES?: YES
GROOMING: INDEPENDENT
HEARING - RIGHT EAR: FUNCTIONAL

## 2025-07-12 ASSESSMENT — PATIENT HEALTH QUESTIONNAIRE - PHQ9
SUM OF ALL RESPONSES TO PHQ9 QUESTIONS 1 & 2: 0
2. FEELING DOWN, DEPRESSED OR HOPELESS: NOT AT ALL
1. LITTLE INTEREST OR PLEASURE IN DOING THINGS: NOT AT ALL

## 2025-07-12 ASSESSMENT — LIFESTYLE VARIABLES
HOW OFTEN DO YOU HAVE 6 OR MORE DRINKS ON ONE OCCASION: NEVER
AUDIT-C TOTAL SCORE: 0
HOW OFTEN DO YOU HAVE A DRINK CONTAINING ALCOHOL: NEVER
SKIP TO QUESTIONS 9-10: 1
SKIP TO QUESTIONS 9-10: 1
AUDIT-C TOTAL SCORE: 0
AUDIT-C TOTAL SCORE: 0
HOW MANY STANDARD DRINKS CONTAINING ALCOHOL DO YOU HAVE ON A TYPICAL DAY: PATIENT DOES NOT DRINK

## 2025-07-12 ASSESSMENT — PAIN - FUNCTIONAL ASSESSMENT
PAIN_FUNCTIONAL_ASSESSMENT: 0-10
PAIN_FUNCTIONAL_ASSESSMENT: 0-10
PAIN_FUNCTIONAL_ASSESSMENT: UNABLE TO SELF-REPORT
PAIN_FUNCTIONAL_ASSESSMENT: 0-10

## 2025-07-12 NOTE — CONSULTS
"Reason For Consult  Ureteral stone    History Of Present Illness  Antonio Harrison is a 54 y.o. female presenting with right sided pain.  CT scan identified a 5 mm stone in the distal right ureter.  She passed the stone this AM.  She is feeling better, has some mild pain remaining.  She has DAVID with a creatinine of 1.84.  She has a prior history of stones     Past Medical History  She has a past medical history of Chest pain, unspecified (01/27/2022), Encounter for screening, unspecified (03/27/2017), Other conditions influencing health status, Personal history of diseases of the blood and blood-forming organs and certain disorders involving the immune mechanism, and Personal history of other diseases of the circulatory system.    Surgical History  She has a past surgical history that includes Eye surgery (05/17/2016); Adenoidectomy (05/17/2016); Stomach surgery (05/17/2016); Other surgical history (05/17/2016); and Tonsillectomy (05/17/2016).     Social History  She reports that she has never smoked. She has never used smokeless tobacco. She reports that she does not drink alcohol and does not use drugs.    Family History  Family History[1]     Allergies  Cephalosporins and Loracarbef    Review of Systems  Right flank pain     Physical Exam  Alert, oriented  Normal resp effort  RRR  Abdomen soft, nontender     Last Recorded Vitals  Blood pressure 117/80, pulse 92, temperature 36.1 °C (97 °F), temperature source Oral, resp. rate 16, height 1.6 m (5' 2.99\"), weight 77.1 kg (170 lb), SpO2 91%.    Relevant Results      Creatinine 1.84.  CT viewed, 5 mm stone in distal right ureter     Assessment/Plan     54 year old had a 5 mm stone in the distal right ureter which she passed.  She has a prior history of stones whiich she passed.  He stone is complicated by DAVID.  She may be discharged.  She will have labs drawn next week.    We will contact her to schedule follow up.      Hector Valera MD         [1] No family history " on file.

## 2025-07-12 NOTE — ED PROVIDER NOTES
Emergency Department Provider Note       History of Present Illness     History provided by: Patient  Limitations to History: None  External Records Reviewed with Brief Summary: None    HPI:  Antonio Harrison is a 54 y.o. female presenting for right-sided flank pain.  The patient started spearing seeing hematuria and was placed on ciprofloxacin by primary care with presumed urinary tract infection.  Patient has had worsening pain and given the bleeding was encouraged to come to the emergency department to rule out hydronephrosis    Physical Exam   Triage vitals:  T 36.3 °C (97.3 °F)  HR (!) 102  /67  RR 17  O2 100 % None (Room air)    Suprapubic tenderness and right lower quadrant tenderness without rebound or guarding.  No CVA tenderness present.  Appears well-hydrated with moist mucous membranes.  Normal peripheral perfusion with 2+ radial pulses.      Medical Decision Making & ED Course   Medical Decision Makin y.o. female presenting for right-sided flank pain.  History physical examination most concerning for UTI progression to pyelonephritis versus nephrolithiasis versus obstructive etiology.  Less concerned about appendicitis or colonic symptoms given the lack of change in stool habits.  The patient's urinalysis was suggestive of infectious etiology currently on antibiotics but we will send for culture.  The patient had a CT which did demonstrate a right-sided 5 mm obstructive nephrolithiasis with moderate hydronephrosis.  She is nonseptic appearing and has appropriate vital signs at this time.  Pain improved with IV narcotic pain medication.  Was given additional dose of antibiotics here in the emergency department.  The patient was admitted to the hospitalist service with urology consultation.  ----      Differential diagnoses considered include but are not limited to: Please see MDM    Social Determinants of Health which Significantly Impact Care: Social Determinants of Health which  Significantly Impact Care: None identified     EKG Independent Interpretation: EKG not obtained    Independent Result Review and Interpretation: Right-sided nephrolithiasis confirmed by radiology report    Chronic conditions affecting the patient's care: As documented above in [facility]    The patient was discussed with the following consultants/services: Dr. Valera and Admission Coordinator who accepted the patient for admission    Care Considerations: As documented above in [facility]    ED Course:  Diagnoses as of 07/12/25 0420   Acute cystitis with hematuria   Hydronephrosis of right kidney   Right nephrolithiasis   Hydronephrosis with renal and ureteral calculous obstruction       Disposition   As a result of their workup, the patient will require admission to the hospital.  The patient was informed of her diagnosis.  The patient was given the opportunity to ask questions and I answered them. The patient agreed to be admitted to the hospital.    Procedures   Procedures        Mamadou Bautista MD  Emergency Medicine                                                       Mamadou Bautista MD  07/12/25 0422

## 2025-07-12 NOTE — ED TRIAGE NOTES
Pt with complaints of possible kidney stone. Pt states on Monday she noticed blood in her urine. Pt saw her PCP on Wednesday and was placed on antibiotics. Pt states they helped for a bit but then today she developed R sided flank pain. Pt states that she has had kidney stones in the past and this feels similar. Pt states she also has nausea.

## 2025-07-12 NOTE — H&P
History Of Present Illness  Antonio Harrison is a 54 y.o. female (full code) with pertinent medical history diabetes type 2, hypertension and past history of kidney stones who presented to [facility] ER with gross hematuria that began 5 days ago, urinary frequency and urgency that began 3 days ago and began having severe right CVA tenderness with flank pain that radiated to her right groin 2 days ago.  She saw her PCP 3 days ago and was started on Cipro and symptoms worsen.  Right flank pain was 10 out of 10 at its worst, is constant stabbing and waxes and wanes.  Currently the pain is 6 out of 10.  She also had associated fever, chills, rigors, nausea and vomiting but denied chest pain, cough, dyspnea, rebound tenderness or any other complaint and review of systems.  Patient is tolerant to    Patient is able to take penicillin but is allergic to cephalosporin.    Pertinent workup in the ER recently reviewed and interpreted included: Creatinine 1.8/GFR 32 was previously 1.2/GFR 52, INR 1.1, no leukocytosis, but positive neutrophilia, UA showed +2 nitrites, +4 bacteria, no white cells or leuks, +1 ketones, +3 microscopic hematuria/proteinuria, no white blood cells; urine culture in process.  EKG showed sinus rhythm and prolonged QT.  CT of the abdomen pelvis showed moderate right hydroureteronephrosis secondary to obstructing 0.5 cm calculi with right vesicoureteral junction. Additional nonobstructing 1-2 mm bilateral renal calculi.      Past Medical History  She has a past medical history of Chest pain, unspecified (01/27/2022), Encounter for screening, unspecified (03/27/2017), Other conditions influencing health status, Personal history of diseases of the blood and blood-forming organs and certain disorders involving the immune mechanism, and Personal history of other diseases of the circulatory system, ADHD, Alvarado's esophagus, anemia, hyperlipidemia, hypertension, diabetes, anxiety, kidney stones.    Surgical  History  She has a past surgical history that includes Eye surgery (05/17/2016); Adenoidectomy (05/17/2016); Stomach surgery (05/17/2016); Other surgical history (05/17/2016); and Tonsillectomy (05/17/2016).     Social History  She reports that she has never smoked. She has never used smokeless tobacco. She reports that she does not drink alcohol and does not use drugs.    Family History  No family history of kidney stones.     Allergies  Cephalosporins and Loracarbef    Review of Systems   Constitutional:  Positive for chills and fever.        + rigors   HENT:  Negative for sore throat and tinnitus.    Respiratory:  Negative for cough and shortness of breath.    Cardiovascular:  Negative for chest pain and leg swelling.   Gastrointestinal:  Positive for nausea and vomiting. Negative for constipation and diarrhea.        Right sided pelvic pain without rebound pain   Genitourinary:  Positive for flank pain, frequency, hematuria, pelvic pain and urgency. Negative for dysuria.        Pain that radiates from the right lower back and flank and pelvis   Musculoskeletal:  Positive for back pain.   Skin:  Negative for color change and pallor.   Neurological:  Negative for dizziness and headaches.   Psychiatric/Behavioral:  Negative for agitation, behavioral problems and confusion.         Physical Exam  Vitals reviewed.   Constitutional:       General: She is not in acute distress.     Appearance: She is obese. She is not ill-appearing, toxic-appearing or diaphoretic.   HENT:      Head: Normocephalic and atraumatic.      Mouth/Throat:      Mouth: Mucous membranes are moist.      Pharynx: Oropharynx is clear.   Eyes:      Extraocular Movements: Extraocular movements intact.      Conjunctiva/sclera: Conjunctivae normal.   Cardiovascular:      Rate and Rhythm: Normal rate and regular rhythm.      Pulses: Normal pulses.      Heart sounds: No murmur heard.  Pulmonary:      Effort: Pulmonary effort is normal.      Breath sounds:  Normal breath sounds.      Comments: Lungs CL to diminished BL  Abdominal:      General: Bowel sounds are normal. There is no distension.      Palpations: Abdomen is soft.      Tenderness: There is abdominal tenderness. There is right CVA tenderness. There is no left CVA tenderness, guarding or rebound.      Comments: Right lower pelvic pain/tenderness, no rebound tenderness   Musculoskeletal:         General: No swelling. Normal range of motion.      Cervical back: Normal range of motion.      Right lower leg: No edema.      Left lower leg: No edema.   Skin:     General: Skin is warm and dry.      Coloration: Skin is pale.   Neurological:      General: No focal deficit present.      Mental Status: She is alert and oriented to person, place, and time. Mental status is at baseline.   Psychiatric:         Mood and Affect: Mood normal.         Behavior: Behavior normal.       Last Recorded Vitals  /67   Pulse (!) 102   Temp 36.3 °C (97.3 °F) (Temporal)   Resp 17   Wt 77.1 kg (170 lb)   SpO2 100%     Relevant Results  Scheduled medications  Scheduled Medications[1]  Continuous medications  Continuous Medications[2]  PRN medications  PRN Medications[3]    Results for orders placed or performed during the hospital encounter of 07/12/25 (from the past 24 hours)   CBC and Auto Differential   Result Value Ref Range    WBC 10.3 4.4 - 11.3 x10*3/uL    nRBC 0.0 0.0 - 0.0 /100 WBCs    RBC 4.83 4.00 - 5.20 x10*6/uL    Hemoglobin 14.2 12.0 - 16.0 g/dL    Hematocrit 39.8 36.0 - 46.0 %    MCV 82 80 - 100 fL    MCH 29.4 26.0 - 34.0 pg    MCHC 35.7 32.0 - 36.0 g/dL    RDW 13.2 11.5 - 14.5 %    Platelets 293 150 - 450 x10*3/uL    Neutrophils % 78.2 40.0 - 80.0 %    Immature Granulocytes %, Automated 0.2 0.0 - 0.9 %    Lymphocytes % 12.5 13.0 - 44.0 %    Monocytes % 5.0 2.0 - 10.0 %    Eosinophils % 3.7 0.0 - 6.0 %    Basophils % 0.4 0.0 - 2.0 %    Neutrophils Absolute 8.07 (H) 1.20 - 7.70 x10*3/uL    Immature Granulocytes  Absolute, Automated 0.02 0.00 - 0.70 x10*3/uL    Lymphocytes Absolute 1.29 1.20 - 4.80 x10*3/uL    Monocytes Absolute 0.52 0.10 - 1.00 x10*3/uL    Eosinophils Absolute 0.38 0.00 - 0.70 x10*3/uL    Basophils Absolute 0.04 0.00 - 0.10 x10*3/uL   Comprehensive metabolic panel   Result Value Ref Range    Glucose 145 (H) 74 - 99 mg/dL    Sodium 136 136 - 145 mmol/L    Potassium 3.9 3.5 - 5.3 mmol/L    Chloride 102 98 - 107 mmol/L    Bicarbonate 19 (L) 21 - 32 mmol/L    Anion Gap 19 10 - 20 mmol/L    Urea Nitrogen 19 6 - 23 mg/dL    Creatinine 1.84 (H) 0.50 - 1.05 mg/dL    eGFR 32 (L) >60 mL/min/1.73m*2    Calcium 9.5 8.6 - 10.3 mg/dL    Albumin 4.2 3.4 - 5.0 g/dL    Alkaline Phosphatase 81 33 - 110 U/L    Total Protein 7.4 6.4 - 8.2 g/dL    AST 18 9 - 39 U/L    Bilirubin, Total 1.5 (H) 0.0 - 1.2 mg/dL    ALT 21 7 - 45 U/L   Lipase   Result Value Ref Range    Lipase 34 9 - 82 U/L   Urinalysis with Reflex Culture and Microscopic   Result Value Ref Range    Color, Urine Dark-Yellow Light-Yellow, Yellow, Dark-Yellow    Appearance, Urine Turbid (N) Clear    Specific Gravity, Urine 1.028 1.005 - 1.035    pH, Urine 6.0 5.0, 5.5, 6.0, 6.5, 7.0, 7.5, 8.0    Protein, Urine 50 (1+) (A) NEGATIVE, 10 (TRACE), 20 (TRACE) mg/dL    Glucose, Urine Normal Normal mg/dL    Blood, Urine OVER (3+) (A) NEGATIVE mg/dL    Ketones, Urine 10 (1+) (A) NEGATIVE mg/dL    Bilirubin, Urine 2 (2+) (A) NEGATIVE mg/dL    Urobilinogen, Urine 4 (2+) (A) Normal mg/dL    Nitrite, Urine 2+ (A) NEGATIVE    Leukocyte Esterase, Urine NEGATIVE NEGATIVE   Microscopic Only, Urine   Result Value Ref Range    WBC, Urine 1-5 1-5, NONE /HPF    RBC, Urine >20 (A) NONE, 1-2, 3-5 /HPF    Squamous Epithelial Cells, Urine 10-25 (FEW) Reference range not established. /HPF    Bacteria, Urine 4+ (A) NONE SEEN /HPF    Mucus, Urine FEW Reference range not established. /LPF    Hyaline Casts, Urine 1+ (A) NONE /LPF   Protime-INR   Result Value Ref Range    Protime 12.5 (H) 9.8 -  12.4 seconds    INR 1.1 0.9 - 1.1   Type and screen   Result Value Ref Range    ABO TYPE A     Rh TYPE POS      CT abdomen pelvis wo IV contrast  Result Date: 7/12/2025  Interpreted By:  Kai Franco, STUDY: CT ABDOMEN PELVIS WO IV CONTRAST;  7/12/2025 1:17 am   INDICATION: Signs/Symptoms:Right-sided flank pain with history of nephrolithiasis.   COMPARISON: None   ACCESSION NUMBER(S): [id number]   ORDERING CLINICIAN: KAMALJIT MAGANA   TECHNIQUE: Axial CT images of the abdomen and pelvis with coronal and sagittal reconstructed images obtained. No intravenous contrast   FINDINGS: LOWER CHEST: Mild subsegmental atelectasis.   LIVER: Within normal limits.   BILE DUCTS: Normal caliber.   GALLBLADDER: Cholecystectomy.   PANCREAS: Within normal limits.   SPLEEN: Within normal limits.   ADRENALS: Within normal limits.   KIDNEYS, URETERS, and BLADDER: No left hydronephrosis. 1-2 mm nonobstructing left lower pole renal calculus. There is moderate right hydroureteronephrosis secondary to obstructing 0.5 cm calculus at the right vesicoureteral junction. 1-2 mm nonobstructing right upper pole renal calculi. The previously seen left distal ureteral calculi are no longer present.  Ureters are non-dilated. Urinary bladder is decompressed, limiting evaluation.   REPRODUCTIVE: No pelvic masses.   VESSELS: Aorta and IVC appear normal.   RETROPERITONEUM and LYMPH NODES: Borderline enlarged 1.1 cm left periaortic lymph node, similar to 12/09/2024.   BOWEL: Small hiatal hernia. Postsurgical change related to gastric bypass. Small bowel is non-dilated. Normal appendix. Large bowel is normal.   PERITONEUM: No ascites or free air. No fluid collection.   BODY WALL: Small fat containing ventral supraumbilical abdominal wall hernias with slight surrounding fat stranding which appear similar to 12/09/2024. Very small fat containing periumbilical hernia.   MUSCULOSKELETAL: No acute osseous abnormality or suspicious osseous lesions.  There is mild multilevel spinal degenerative change.       1. Moderate right hydroureteronephrosis secondary to obstructing 0.5 cm calculus at the right vesicoureteral junction. 2. Additional nonobstructing 1-2 mm bilateral renal calculi. 3. Borderline enlarged 1.1 cm left periaortic lymph node, similar to 12/09/2024. 4. Small hiatal hernia. Stable postsurgical change related to gastric bypass.   Signed by: Kai Franco 7/12/2025 1:35 AM Dictation workstation:   [id number]      Assessment/Plan   Assessment & Plan    Early Right Pyelonephritis with Hematuria with Obstructing Stone and Hydronephrosis/Pre-Op Clearance  -No sepsis, + neutrophilia  -RCRI score 1 low to proceed to OR; METS Score >4  Zosyn q 6 (PNCN ok, allergy to cephalosporin), flomax  F/U urine culture  Urology consulted  NPO/IV fluids  Tylenol, oxycodone and dilaudid/OARRS  PRN Narcan  Strain urine    DAVID  -Likely D/t obstruction and UTI  -Cannot exclude early Nephritic Syndrome with hematuria, proteinuria, DAVID and flank pain   Added Spot creatine and protein sample-f/U  Added nephrology consult-appreciate recs  Monitor kidney function  IV fluids    Prolonged QT  Avoid QT prolonging meds  PRN tigan for nausea    DM II  Sliding scale and accucheck Q 6  Hypoglycemia protocol    Alvarado's Esophagus  PPI    HTN  BB continue, losartan held    ADHD/ADD  Adderall    Incidental CT Findings  Borderline enlarged 1.1 cm left periaortic lymph node, similar to   12/09/2024. F/U with PCP for [place]  Small hiatal hernia-asymptomatic and no need to f/u    Full Code    DVT PX  SCDs only  Tamie Bhatti, APRN-CNP  75-minute spent interviewing assessing patient, placing orders, updating MAR, care plan, CODE STATUS and personally reviewing chart labs and diagnostics with personal interpretation.             [1] acetaminophen, 975 mg, oral, q8h  ciprofloxacin, 400 mg, intravenous, q12h  docusate sodium, 100 mg, oral, BID  tamsulosin, 0.4 mg, oral, Daily  [2]  sodium chloride 0.9%, 125 mL/hr, Last Rate: 125 mL/hr (07/12/25 0549)  [3] PRN medications: HYDROmorphone, naloxone, oxyCODONE, trimethobenzamide

## 2025-07-12 NOTE — SIGNIFICANT EVENT
"Updated Assessment/Plan:  Assessment/Plan:  Acute Medical Issues:  # Right uteteral stone  # Moderate right hydroureteronephrosis  # Bilateral nephrolithiasis   # Complicated UTI  Antibiotic regimen:: Changed antibiotic to Cipro 400mg IV 12hr  Maintenance fluids: Discontinued fluids as passed stone and was reportedly developing some hand swelling. Encourage PO intake  Pain regimen: Tylenol 975mg Q6hr, Oxycodone IR 5mg PO Q6hr PRN moderate pain, Dilaudid 0.5mg IV Q4hr PRN breakthrough pain  Bowel Regimen: Colace 100mg BID  Additional therapy: Flomax 0.4mg PO daily, Tigan 200mg IM Q8hr PRN  NPO as Urology consulted for recs. May need procedure  Urology consult: \"She may be discharged. Will have labs drawn next week. We will contact her to schedule followup\"    # DAVID on CKD  Review of EMR shows a variable creatinine but most recent past 7 months ago a creatinine of 1.24 and lowest a year ago was 0.99 and GFR of 52.  When rechecking kidney function around noon, the creatinine increased to 2.01 which would be considered an DAVID was would be 1.5x greater than in past level thus meeting criteria for DAVID on CKD at this time  Lhold home Losartan given DAVID. Change antibiotic off Zosyn as may be contributing. Serum and Urine osmolalities ordered. Encourage PO intake.   Nephrology was consulted overnight though the etiology is most likely related to above issues    Chronic Major Comorbidities:  # Hypertension: Metoprolol succinate 50mg daily / Losartan on hold due to kidney function and presenting  issues  # Diabetes mellitus type 2: Insulin lispro sliding scale with hypoglycemia precautions in place  # GERD: Protonix 20mg PO daily  # ADHD/ADD:Adderall 30mg daily    DVT prophylaxis: SCD  Code Status: Full Code    Disposition: Anticipate discharge home once medically ready.   "

## 2025-07-12 NOTE — PROGRESS NOTES
07/12/25 1006   Discharge Planning   Living Arrangements Spouse/significant other;Children   Support Systems Spouse/significant other;Children   Assistance Needed Patient is A&OX4, independent in ADLs, ambulates without assistive devices, drives and wears no O2, CPAP or Bipap at baseline. No active HHC agency.   Type of Residence Private residence   Number of Stairs to Enter Residence 0   Number of Stairs Within Residence 18  (split level, 9 teps up, 9 steps down)   Do you have animals or pets at home? No   Who is requesting discharge planning? Provider   Home or Post Acute Services None   Expected Discharge Disposition Home  (Home, no needs. Patient denied any home going needs or need for HHC)   Does the patient need discharge transport arranged? No   Financial Resource Strain   How hard is it for you to pay for the very basics like food, housing, medical care, and heating? Somewhat   Housing Stability   In the last 12 months, was there a time when you were not able to pay the mortgage or rent on time? N   In the past 12 months, how many times have you moved where you were living? 0   At any time in the past 12 months, were you homeless or living in a shelter (including now)? N   Transportation Needs   In the past 12 months, has lack of transportation kept you from medical appointments or from getting medications? no   In the past 12 months, has lack of transportation kept you from meetings, work, or from getting things needed for daily living? No   Stroke Family Assessment   Stroke Family Assessment Needed No   Intensity of Service   Intensity of Service 0-30 min

## 2025-07-12 NOTE — CARE PLAN
Problem: Pain - Adult  Goal: Verbalizes/displays adequate comfort level or baseline comfort level  Outcome: Progressing     Problem: Safety - Adult  Goal: Free from fall injury  Outcome: Progressing     Problem: Discharge Planning  Goal: Discharge to home or other facility with appropriate resources  Outcome: Progressing     Problem: Chronic Conditions and Co-morbidities  Goal: Patient's chronic conditions and co-morbidity symptoms are monitored and maintained or improved  Outcome: Progressing     Problem: Nutrition  Goal: Nutrient intake appropriate for maintaining nutritional needs  Outcome: Progressing   The patient's goals for the shift include      The clinical goals for the shift include pain control and npo

## 2025-07-12 NOTE — CONSULTS
Consults  Referred by Dr Milton Santiago MD: Samuel Martinez MD    Reason For Consult  DAVID    History Of Present Illness  Antonio Harrison is a 54 y.o. female presenting with abdominal pain, with noted Rt nephrolithiasis and hydronephrosis..     Past Medical History  She has a past medical history of Chest pain, unspecified (01/27/2022), Encounter for screening, unspecified (03/27/2017), Other conditions influencing health status, Personal history of diseases of the blood and blood-forming organs and certain disorders involving the immune mechanism, and Personal history of other diseases of the circulatory system.    Surgical History  She has a past surgical history that includes Eye surgery (05/17/2016); Adenoidectomy (05/17/2016); Stomach surgery (05/17/2016); Other surgical history (05/17/2016); and Tonsillectomy (05/17/2016).     Social History     Occupational History    Not on file   Tobacco Use    Smoking status: Never    Smokeless tobacco: Never   Substance and Sexual Activity    Alcohol use: Never    Drug use: Never    Sexual activity: Not on file     Travel History   Travel since 06/12/25    No documented travel since 06/12/25            Pets: unknown  Hobbies: unknown    Family History  Family History[1]  Allergies  Cephalosporins and Loracarbef     Immunization History   Administered Date(s) Administered    Flu vaccine, quadrivalent, no egg protein, age 6 month or greater (FLUCELVAX) 10/29/2022, 11/18/2023    Flu vaccine, trivalent, preservative free, age 6 months and greater (Fluarix/Fluzone/Flulaval) 10/16/2024    Influenza, Unspecified 09/17/2021    Moderna SARS-CoV-2 Vaccination 01/12/2021, 02/09/2021, 11/05/2021     Medications  Home medications:  Prescriptions Prior to Admission[2]  Current medications:  Scheduled medications  Scheduled Medications[3]  Continuous medications  Continuous Medications[4]  PRN medications  PRN Medications[5]    Review of Systems     Objective  Range of Vitals (last 24  "hours)  Heart Rate:  []   Temp:  [36.1 °C (97 °F)-36.4 °C (97.5 °F)]   Resp:  [16-17]   BP: (102-117)/(60-80)   Height:  [160 cm (5' 2.99\")-160 cm (5' 3\")]   Weight:  [77.1 kg (170 lb)]   SpO2:  [91 %-100 %]   Daily Weight  07/12/25 : 77.1 kg (170 lb)    Body mass index is 30.12 kg/m².     Physical Exam     Relevant Results  Outside Hospital Results  No  Labs  Results from last 72 hours   Lab Units 07/12/25  0100   WBC AUTO x10*3/uL 10.3   HEMOGLOBIN g/dL 14.2   HEMATOCRIT % 39.8   PLATELETS AUTO x10*3/uL 293   NEUTROS PCT AUTO % 78.2   LYMPHS PCT AUTO % 12.5   MONOS PCT AUTO % 5.0   EOS PCT AUTO % 3.7     Results from last 72 hours   Lab Units 07/12/25  1243 07/12/25  0100   SODIUM mmol/L 138 136   POTASSIUM mmol/L 4.2 3.9   CHLORIDE mmol/L 108* 102   CO2 mmol/L 23 19*   BUN mg/dL 17 19   CREATININE mg/dL 2.01* 1.84*   GLUCOSE mg/dL 111* 145*   CALCIUM mg/dL 8.1* 9.5   ANION GAP mmol/L 11 19   EGFR mL/min/1.73m*2 29* 32*   PHOSPHORUS mg/dL 4.0  --      Results from last 72 hours   Lab Units 07/12/25  1243 07/12/25  0100   ALK PHOS U/L  --  81   BILIRUBIN TOTAL mg/dL  --  1.5*   PROTEIN TOTAL g/dL  --  7.4   ALT U/L  --  21   AST U/L  --  18   ALBUMIN g/dL 3.2* 4.2     Estimated Creatinine Clearance: 31.8 mL/min (A) (by C-G formula based on SCr of 2.01 mg/dL (H)).  Sedimentation Rate   Date Value Ref Range Status   05/08/2019 1 0 - 20 mm/h Final     No results found for: \"HIV1X2\", \"HIVCONF\", \"KSWFHB5FA\"  No results found for: \"HEPCABINIT\", \"HEPCAB\", \"HCVPCRQUANT\"  Microbiology  No results found for the last 90 days.      Imaging    CT abdomen pelvis wo IV contrast  Result Date: 7/12/2025  Interpreted By:  Kai Franco, STUDY: CT ABDOMEN PELVIS WO IV CONTRAST;  7/12/2025 1:17 am   INDICATION: Signs/Symptoms:Right-sided flank pain with history of nephrolithiasis.   COMPARISON: None   ACCESSION NUMBER(S): [id number]   ORDERING CLINICIAN: KAMALJIT MAGANA   TECHNIQUE: Axial CT images of the abdomen and " pelvis with coronal and sagittal reconstructed images obtained. No intravenous contrast   FINDINGS: LOWER CHEST: Mild subsegmental atelectasis.   LIVER: Within normal limits.   BILE DUCTS: Normal caliber.   GALLBLADDER: Cholecystectomy.   PANCREAS: Within normal limits.   SPLEEN: Within normal limits.   ADRENALS: Within normal limits.   KIDNEYS, URETERS, and BLADDER: No left hydronephrosis. 1-2 mm nonobstructing left lower pole renal calculus. There is moderate right hydroureteronephrosis secondary to obstructing 0.5 cm calculus at the right vesicoureteral junction. 1-2 mm nonobstructing right upper pole renal calculi. The previously seen left distal ureteral calculi are no longer present.  Ureters are non-dilated. Urinary bladder is decompressed, limiting evaluation.   REPRODUCTIVE: No pelvic masses.   VESSELS: Aorta and IVC appear normal.   RETROPERITONEUM and LYMPH NODES: Borderline enlarged 1.1 cm left periaortic lymph node, similar to 12/09/2024.   BOWEL: Small hiatal hernia. Postsurgical change related to gastric bypass. Small bowel is non-dilated. Normal appendix. Large bowel is normal.   PERITONEUM: No ascites or free air. No fluid collection.   BODY WALL: Small fat containing ventral supraumbilical abdominal wall hernias with slight surrounding fat stranding which appear similar to 12/09/2024. Very small fat containing periumbilical hernia.   MUSCULOSKELETAL: No acute osseous abnormality or suspicious osseous lesions. There is mild multilevel spinal degenerative change.       1. Moderate right hydroureteronephrosis secondary to obstructing 0.5 cm calculus at the right vesicoureteral junction. 2. Additional nonobstructing 1-2 mm bilateral renal calculi. 3. Borderline enlarged 1.1 cm left periaortic lymph node, similar to 12/09/2024. 4. Small hiatal hernia. Stable postsurgical change related to gastric bypass.   Signed by: Kai Franco 7/12/2025 1:35 AM Dictation workstation:   [id number]        Assessment/Plan      DAVID due to Rt hydronephrosis, as well as exacerbated by pre-hospital poor oral intake, while on Losartan.  Denies any NSAID use.  Baseline Scr 0.9 to 1.1.  Presented with Scr 1.84, and currently 2.01.  Lytes ok.  CT as above.  Passed stones today, with  following.  Checking FeNa+ for complete sake.  Continue to hydrate until DAVID better, as well as lower BP.  T2DM hx, but minimal proteinuric losses - HOLDING Metformin for now given DAVID.     Rt hydro, nephrolithiasis.   following.  Stone analysis pending.     Hx of HTN, normally on Losartan - holding for now given DAVID and lower BP ( to 110's).  No acute s/sx HF.  IVF as above for now.     T2DM hx.  Holding Metformin for now given DAVID.      I spent 20 minutes in the professional and overall care of this patient.      Devin Hannon, APRN-CNP       [1] No family history on file.  [2]   Medications Prior to Admission   Medication Sig Dispense Refill Last Dose/Taking    amphetamine-dextroamphetamine (Adderall) 20 mg tablet Take 1.5 tablets (30 mg) by mouth once daily.   7/11/2025    ciprofloxacin (Cipro) 500 mg tablet Take 1 tablet (500 mg) by mouth 2 times a day for 10 days. 20 tablet 0 7/11/2025    dulaglutide (Trulicity) 4.5 mg/0.5 mL pen injector Inject 4.5 mg under the skin 1 (one) time per week. 2 mL 2 Past Week    esomeprazole (NexIUM) 20 mg DR capsule Take 1 capsule (20 mg) by mouth once daily in the morning. Take before meals. Do not open capsule.   7/11/2025    losartan (Cozaar) 100 mg tablet TAKE 1 TABLET DAILY 90 tablet 3 7/11/2025    metFORMIN (Glucophage) 1,000 mg tablet Take 1 tablet (1,000 mg) by mouth 2 times daily (morning and late afternoon). 180 tablet 0 7/11/2025    metoprolol succinate XL (Toprol-XL) 50 mg 24 hr tablet Take 1 tablet (50 mg) by mouth once daily. 90 tablet 0 7/11/2025    aspirin 81 mg chewable tablet Chew 1 tablet (81 mg) once daily.       cyanocobalamin, vitamin B-12, 1,000 mcg/mL kit Inject 1,000  mcg as directed every 30 (thirty) days. 1 kit 12     cyclobenzaprine (Flexeril) 10 mg tablet TAKE 1 TABLET THREE TIMES A DAY 60 tablet 17     fexofenadine (Allegra) 180 mg tablet Take 1 tablet (180 mg) by mouth once daily.       fluticasone (Flonase Sensimist) 27.5 mcg/actuation nasal spray Administer 1 spray into each nostril once daily. 10 g 5     furosemide (Lasix) 40 mg tablet Take 1 tablet (40 mg) by mouth once daily as needed.       hydrOXYzine HCL (Atarax) 25 mg tablet TAKE 1 TABLET THREE TIMES A DAY AS NEEDED 180 tablet 1     ibuprofen 600 mg tablet Take 1 tablet (600 mg) by mouth 3 times a day as needed.       loratadine (Claritin) 10 mg tablet Take 1 tablet (10 mg) by mouth once daily. 90 tablet 0     [] ondansetron (Zofran) 4 mg tablet Take 1 tablet (4 mg) by mouth every 8 hours if needed for nausea or vomiting. 60 tablet 0    [3] acetaminophen, 975 mg, oral, q6h  amphetamine-dextroamphetamine, 30 mg, oral, Daily  ciprofloxacin, 400 mg, intravenous, q12h  docusate sodium, 100 mg, oral, BID  insulin lispro, 0-10 Units, subcutaneous, q6h  lidocaine, 1 patch, transdermal, Daily  [Held by provider] losartan, 100 mg, oral, Daily  metoprolol succinate XL, 50 mg, oral, Daily  pantoprazole, 20 mg, oral, Daily before breakfast  tamsulosin, 0.4 mg, oral, Daily    [4] lactated Ringer's, 100 mL/hr    [5] PRN medications: dextrose, dextrose, glucagon, glucagon, HYDROmorphone, naloxone, ondansetron, oxyCODONE, trimethobenzamide

## 2025-07-13 VITALS
BODY MASS INDEX: 30.12 KG/M2 | SYSTOLIC BLOOD PRESSURE: 138 MMHG | TEMPERATURE: 97.5 F | OXYGEN SATURATION: 96 % | DIASTOLIC BLOOD PRESSURE: 77 MMHG | HEART RATE: 85 BPM | RESPIRATION RATE: 18 BRPM | HEIGHT: 63 IN | WEIGHT: 170 LBS

## 2025-07-13 LAB
ALBUMIN SERPL BCP-MCNC: 3 G/DL (ref 3.4–5)
ANION GAP SERPL CALC-SCNC: 11 MMOL/L (ref 10–20)
BACTERIA UR CULT: NO GROWTH
BUN SERPL-MCNC: 13 MG/DL (ref 6–23)
CALCIUM SERPL-MCNC: 8.1 MG/DL (ref 8.6–10.3)
CHLORIDE SERPL-SCNC: 110 MMOL/L (ref 98–107)
CO2 SERPL-SCNC: 22 MMOL/L (ref 21–32)
CREAT SERPL-MCNC: 1.48 MG/DL (ref 0.5–1.05)
EGFRCR SERPLBLD CKD-EPI 2021: 42 ML/MIN/1.73M*2
ERYTHROCYTE [DISTWIDTH] IN BLOOD BY AUTOMATED COUNT: 13.4 % (ref 11.5–14.5)
GLUCOSE SERPL-MCNC: 117 MG/DL (ref 74–99)
HCT VFR BLD AUTO: 32.8 % (ref 36–46)
HGB BLD-MCNC: 11.4 G/DL (ref 12–16)
MCH RBC QN AUTO: 29.4 PG (ref 26–34)
MCHC RBC AUTO-ENTMCNC: 34.8 G/DL (ref 32–36)
MCV RBC AUTO: 85 FL (ref 80–100)
NRBC BLD-RTO: 0 /100 WBCS (ref 0–0)
PHOSPHATE SERPL-MCNC: 4 MG/DL (ref 2.5–4.9)
PLATELET # BLD AUTO: 184 X10*3/UL (ref 150–450)
POTASSIUM SERPL-SCNC: 4.1 MMOL/L (ref 3.5–5.3)
RBC # BLD AUTO: 3.88 X10*6/UL (ref 4–5.2)
SODIUM SERPL-SCNC: 139 MMOL/L (ref 136–145)
WBC # BLD AUTO: 5.1 X10*3/UL (ref 4.4–11.3)

## 2025-07-13 PROCEDURE — 85027 COMPLETE CBC AUTOMATED: CPT | Performed by: INTERNAL MEDICINE

## 2025-07-13 PROCEDURE — 36415 COLL VENOUS BLD VENIPUNCTURE: CPT | Performed by: INTERNAL MEDICINE

## 2025-07-13 PROCEDURE — 99239 HOSP IP/OBS DSCHRG MGMT >30: CPT | Performed by: INTERNAL MEDICINE

## 2025-07-13 PROCEDURE — 80069 RENAL FUNCTION PANEL: CPT | Performed by: INTERNAL MEDICINE

## 2025-07-13 PROCEDURE — 2500000002 HC RX 250 W HCPCS SELF ADMINISTERED DRUGS (ALT 637 FOR MEDICARE OP, ALT 636 FOR OP/ED): Performed by: NURSE PRACTITIONER

## 2025-07-13 PROCEDURE — 2500000004 HC RX 250 GENERAL PHARMACY W/ HCPCS (ALT 636 FOR OP/ED): Performed by: INTERNAL MEDICINE

## 2025-07-13 PROCEDURE — 2500000001 HC RX 250 WO HCPCS SELF ADMINISTERED DRUGS (ALT 637 FOR MEDICARE OP): Performed by: INTERNAL MEDICINE

## 2025-07-13 PROCEDURE — 2500000004 HC RX 250 GENERAL PHARMACY W/ HCPCS (ALT 636 FOR OP/ED): Performed by: NURSE PRACTITIONER

## 2025-07-13 PROCEDURE — 2500000001 HC RX 250 WO HCPCS SELF ADMINISTERED DRUGS (ALT 637 FOR MEDICARE OP): Performed by: NURSE PRACTITIONER

## 2025-07-13 RX ORDER — OXYCODONE HYDROCHLORIDE 5 MG/1
5 TABLET ORAL EVERY 8 HOURS PRN
Qty: 9 TABLET | Refills: 0 | Status: SHIPPED | OUTPATIENT
Start: 2025-07-13 | End: 2025-07-16

## 2025-07-13 RX ORDER — ONDANSETRON 4 MG/1
8 TABLET, FILM COATED ORAL EVERY 8 HOURS PRN
Qty: 12 TABLET | Refills: 0 | Status: SHIPPED | OUTPATIENT
Start: 2025-07-13

## 2025-07-13 RX ADMIN — METOPROLOL SUCCINATE 50 MG: 50 TABLET, EXTENDED RELEASE ORAL at 08:34

## 2025-07-13 RX ADMIN — TAMSULOSIN HYDROCHLORIDE 0.4 MG: 0.4 CAPSULE ORAL at 08:33

## 2025-07-13 RX ADMIN — SODIUM CHLORIDE, SODIUM LACTATE, POTASSIUM CHLORIDE, AND CALCIUM CHLORIDE 100 ML/HR: .6; .31; .03; .02 INJECTION, SOLUTION INTRAVENOUS at 03:53

## 2025-07-13 RX ADMIN — DEXTROAMPHETAMINE SACCHARATE, AMPHETAMINE ASPARTATE, DEXTROAMPHETAMINE SULFATE AND AMPHETAMINE SULFATE 30 MG: 2.5; 2.5; 2.5; 2.5 TABLET ORAL at 08:33

## 2025-07-13 RX ADMIN — CIPROFLOXACIN 400 MG: 400 INJECTION, SOLUTION INTRAVENOUS at 06:12

## 2025-07-13 RX ADMIN — ACETAMINOPHEN 975 MG: 325 TABLET, FILM COATED ORAL at 03:10

## 2025-07-13 RX ADMIN — PANTOPRAZOLE SODIUM 20 MG: 20 TABLET, DELAYED RELEASE ORAL at 06:12

## 2025-07-13 ASSESSMENT — COGNITIVE AND FUNCTIONAL STATUS - GENERAL
MOBILITY SCORE: 24
DAILY ACTIVITIY SCORE: 24

## 2025-07-13 NOTE — CARE PLAN
The patient's goals for the shift include      The clinical goals for the shift include pain control      Problem: Pain - Adult  Goal: Verbalizes/displays adequate comfort level or baseline comfort level  Outcome: Progressing     Problem: Safety - Adult  Goal: Free from fall injury  Outcome: Progressing     Problem: Discharge Planning  Goal: Discharge to home or other facility with appropriate resources  Outcome: Progressing     Problem: Chronic Conditions and Co-morbidities  Goal: Patient's chronic conditions and co-morbidity symptoms are monitored and maintained or improved  Outcome: Progressing     Problem: Nutrition  Goal: Nutrient intake appropriate for maintaining nutritional needs  Outcome: Progressing

## 2025-07-13 NOTE — CARE PLAN
The patient's goals for the shift include pain control     The clinical goals for the shift include pain control

## 2025-07-13 NOTE — PROGRESS NOTES
Being discharged home today.  Ok renal wise.    We've been following r/t DAVID, r/t Rt hydro and exacerbated by poor oral intake, lower BP and pre-hospital Losartan.    Baseline Scr 0.9 to 1.1. Presented with Scr 1.84, up to 2.01 yesterday, and currently 1.48.  Lytes ok, with K+ 4.1    Got IVF overnight, eating well now.  Ok to discontinue IVF, but I encouraged her to continue to drink well, and discussed with her about s/sx of orthostasis.      S/p stone passing, follow up with .  Awaiting stone analysis findings.     to 130's, HR 80's.   Losartan has been on hold.  Would suggest holding for another 24-48 hrs to allow DAVID to continue to improve and BP stabilization.   Breathing fine room air.      Long term DM.  Metformin on hold with DAVID, but with improvement ok to resume.    Minimal proteinuric losses.    Follow up with PCP and  upon discharge.

## 2025-07-13 NOTE — DISCHARGE SUMMARY
Discharge Diagnosis  Right sided moderate Hydrouteronephrosis with renal and ureteral calculous obstruction  Acute Kidney Injury on Chronic Kidney Disease  Complicated UTI    Issues Requiring Follow-Up  Continue to take your already prescribed Ciprofloxacin that you filled prior to hospitalization. If Urine culture reveals an organism that is resistant to Ciprofloxacin, I will call in a different antibiotic and let you know.  Continue to stay hydrated, nourished and get plenty of rest to recuperate.  Followup with your primary care provider within 1 week of hospital discharge. Please call to schedule this appointment at your earliest convenience for post-hospitalization checkup.  A followup with urology should be scheduled for you by the urology service that saw you in hospital per their recommendation. Please attend when made.    Discharge Meds     Medication List      START taking these medications     oxyCODONE 5 mg immediate release tablet; Commonly known as: Roxicodone;   Take 1 tablet (5 mg) by mouth every 8 hours if needed for severe pain (7 -   10) for up to 3 days.     CHANGE how you take these medications     ondansetron 4 mg tablet; Commonly known as: Zofran; Take 2 tablets (8   mg) by mouth every 8 hours if needed for nausea or vomiting.; What   changed: how much to take     CONTINUE taking these medications     amphetamine-dextroamphetamine 20 mg tablet; Commonly known as: Adderall   aspirin 81 mg chewable tablet   ciprofloxacin 500 mg tablet; Commonly known as: Cipro; Take 1 tablet   (500 mg) by mouth 2 times a day for 10 days.   cyanocobalamin (vitamin B-12) 1,000 mcg/mL kit; Inject 1,000 mcg as   directed every 30 (thirty) days.   cyclobenzaprine 10 mg tablet; Commonly known as: Flexeril; TAKE 1 TABLET   THREE TIMES A DAY   esomeprazole 20 mg DR capsule; Commonly known as: NexIUM   fexofenadine 180 mg tablet; Commonly known as: Allegra   Flonase Sensimist 27.5 mcg/actuation nasal spray; Generic drug:    fluticasone; Administer 1 spray into each nostril once daily.   furosemide 40 mg tablet; Commonly known as: Lasix   hydrOXYzine HCL 25 mg tablet; Commonly known as: Atarax; TAKE 1 TABLET   THREE TIMES A DAY AS NEEDED   ibuprofen 600 mg tablet   loratadine 10 mg tablet; Commonly known as: Claritin; Take 1 tablet (10   mg) by mouth once daily.   losartan 100 mg tablet; Commonly known as: Cozaar; TAKE 1 TABLET DAILY   metFORMIN 1,000 mg tablet; Commonly known as: Glucophage; Take 1 tablet   (1,000 mg) by mouth 2 times daily (morning and late afternoon).   metoprolol succinate XL 50 mg 24 hr tablet; Commonly known as:   Toprol-XL; Take 1 tablet (50 mg) by mouth once daily.   Trulicity 4.5 mg/0.5 mL pen injector; Generic drug: dulaglutide; Inject   4.5 mg under the skin 1 (one) time per week.       Test Results Pending At Discharge  Pending Labs       Order Current Status    Extra Urine Gray Tube In process    Stone analysis In process    Urinalysis with Reflex Culture and Microscopic In process    Extra Tubes Preliminary result    Extra Tubes Preliminary result    Lavender Top Preliminary result    PST Top Preliminary result            Hospital Course  This is a 55yo female with medical history significant for HTN, DM, GERD, ADHD/ADD admitted to [facility] for right sided hydroureteronephrosis secondary to bilateral nephrolithiasis and right sided ureteral stone.     Patient admitted to hospitalist service.  During stay, patient managed conservatively with pain control, anti-emetics, maintenance fluids and Flomax. Patient was able to pass multiple stones during her hospitalization early on and symptoms improved. Urology saw patient and cleared patient for discharge. They are to make followup per their documentation.  She was treated for a complicated UTI as well. Initially was on Zosyn switched back to Ciprofloxacin. She already has prescription pills for Cipro at home which she started taking just before hospitalization.  Recommend she continue to take her medications she has as opposed to making her pay for an entirely new script which would be a waste of her money. Hospitalist team to follow the culture to finalization and change antibiotic if needed.   She had an DAVID on CKD that became apparent during stay which was treated with fluids, treating the underlying urinary issues as well as hold nephrotoxic agents. Her kidney function improved with this treatment and she is cleared for discharge.  Patient aware to stay hydrated to help further improve kidney function to her baseline.  Went over discharge plan with her and she is agreeable with plan.  Pt discharged home today (7/13/25).    Time spent with hospital discharge planning approximately 55 minutes.       Pertinent Physical Exam At Time of Discharge  Physical Exam  Constitutional: Pleasant, Awake/Alert/Oriented to person place and time. No distress  Cardiovascular: Regular rate and rhythm, S1, S2. No extra heart sounds or murmurs  Respiratory: Clear to auscultation bilaterally. No wheezing, rales or rhonchi. Good chest wall expansion  Abdomen: Soft, Nontender. Bowel sounds appreciated  Musculoskeletal: ROM intact. Muscle strength grossly intact upper and lower extremities 5/5.   Extremities: Warm and dry. No acute rashes and lesions  Psychiatric: Appropriate mood and affect      Outpatient Follow-Up  Future Appointments   Date Time Provider Department Center   7/15/2025  3:30 PM [facility]   Followup with your primary care provider within 1 week of hospital discharge. Please call to schedule this appointment at your earliest convenience for post-hospitalization checkup.  A followup with urology should be scheduled for you by the urology service that saw you in hospital per their recommendation. Please attend when made.      German Rose,

## 2025-07-14 ENCOUNTER — TELEPHONE (OUTPATIENT)
Dept: INTERNAL MEDICINE | Facility: HOSPITAL | Age: 55
End: 2025-07-14
Payer: OTHER GOVERNMENT

## 2025-07-14 LAB
ATRIAL RATE: 87 BPM
HOLD SPECIMEN: NORMAL
P AXIS: 56 DEGREES
P OFFSET: 179 MS
P ONSET: 131 MS
PR INTERVAL: 172 MS
Q ONSET: 217 MS
QRS COUNT: 14 BEATS
QRS DURATION: 86 MS
QT INTERVAL: 410 MS
QTC CALCULATION(BAZETT): 493 MS
QTC FREDERICIA: 463 MS
R AXIS: 53 DEGREES
T AXIS: 55 DEGREES
T OFFSET: 422 MS
VENTRICULAR RATE: 87 BPM

## 2025-07-15 ENCOUNTER — APPOINTMENT (OUTPATIENT)
Dept: RADIOLOGY | Facility: HOSPITAL | Age: 55
End: 2025-07-15
Payer: OTHER GOVERNMENT

## 2025-07-17 LAB
APPEARANCE STONE: NORMAL
COMPN STONE: NORMAL
SPECIMEN WT: 40 MG

## 2025-07-22 ENCOUNTER — APPOINTMENT (OUTPATIENT)
Dept: RADIOLOGY | Facility: HOSPITAL | Age: 55
End: 2025-07-22
Payer: OTHER GOVERNMENT

## 2025-08-05 ENCOUNTER — APPOINTMENT (OUTPATIENT)
Dept: UROLOGY | Facility: CLINIC | Age: 55
End: 2025-08-05
Payer: OTHER GOVERNMENT

## 2025-08-12 ENCOUNTER — APPOINTMENT (OUTPATIENT)
Dept: RADIOLOGY | Facility: HOSPITAL | Age: 55
End: 2025-08-12
Payer: OTHER GOVERNMENT

## 2025-09-05 ENCOUNTER — APPOINTMENT (OUTPATIENT)
Dept: RADIOLOGY | Facility: HOSPITAL | Age: 55
End: 2025-09-05
Payer: OTHER GOVERNMENT

## 2025-10-03 ENCOUNTER — APPOINTMENT (OUTPATIENT)
Dept: RADIOLOGY | Facility: HOSPITAL | Age: 55
End: 2025-10-03
Payer: OTHER GOVERNMENT

## 2025-10-28 ENCOUNTER — APPOINTMENT (OUTPATIENT)
Dept: UROLOGY | Facility: CLINIC | Age: 55
End: 2025-10-28
Payer: OTHER GOVERNMENT